# Patient Record
Sex: FEMALE | Race: WHITE | NOT HISPANIC OR LATINO | Employment: FULL TIME | URBAN - NONMETROPOLITAN AREA
[De-identification: names, ages, dates, MRNs, and addresses within clinical notes are randomized per-mention and may not be internally consistent; named-entity substitution may affect disease eponyms.]

---

## 2018-03-21 ENCOUNTER — HOSPITAL ENCOUNTER (OUTPATIENT)
Facility: HOSPITAL | Age: 58
Setting detail: OBSERVATION
Discharge: HOME | End: 2018-03-22
Attending: FAMILY MEDICINE | Admitting: FAMILY MEDICINE

## 2018-03-21 ENCOUNTER — APPOINTMENT (OUTPATIENT)
Dept: GENERAL RADIOLOGY | Facility: HOSPITAL | Age: 58
End: 2018-03-21

## 2018-03-21 ENCOUNTER — APPOINTMENT (OUTPATIENT)
Dept: CARDIOLOGY | Facility: HOSPITAL | Age: 58
End: 2018-03-21

## 2018-03-21 PROBLEM — I47.1 SVT (SUPRAVENTRICULAR TACHYCARDIA) (HCC): Status: ACTIVE | Noted: 2018-03-21

## 2018-03-21 LAB
ALBUMIN SERPL-MCNC: 4.1 G/DL (ref 3.5–5)
ALBUMIN/GLOB SERPL: 1.4 G/DL (ref 1.1–2.5)
ALP SERPL-CCNC: 62 U/L (ref 24–120)
ALT SERPL W P-5'-P-CCNC: 51 U/L (ref 0–54)
ANION GAP SERPL CALCULATED.3IONS-SCNC: 11 MMOL/L (ref 4–13)
AST SERPL-CCNC: 51 U/L (ref 7–45)
BASOPHILS # BLD AUTO: 0.02 10*3/MM3 (ref 0–0.2)
BASOPHILS NFR BLD AUTO: 0.3 % (ref 0–2)
BH CV ECHO MEAS - AO MAX PG (FULL): 4.5 MMHG
BH CV ECHO MEAS - AO MAX PG: 8.1 MMHG
BH CV ECHO MEAS - AO MEAN PG (FULL): 2 MMHG
BH CV ECHO MEAS - AO MEAN PG: 4 MMHG
BH CV ECHO MEAS - AO ROOT AREA (BSA CORRECTED): 1.5
BH CV ECHO MEAS - AO ROOT AREA: 5.7 CM^2
BH CV ECHO MEAS - AO ROOT DIAM: 2.7 CM
BH CV ECHO MEAS - AO V2 MAX: 142 CM/SEC
BH CV ECHO MEAS - AO V2 MEAN: 93.6 CM/SEC
BH CV ECHO MEAS - AO V2 VTI: 26.9 CM
BH CV ECHO MEAS - AVA(I,A): 2 CM^2
BH CV ECHO MEAS - AVA(I,D): 2 CM^2
BH CV ECHO MEAS - AVA(V,A): 2.1 CM^2
BH CV ECHO MEAS - AVA(V,D): 2.1 CM^2
BH CV ECHO MEAS - BSA(HAYCOCK): 1.9 M^2
BH CV ECHO MEAS - BSA: 1.8 M^2
BH CV ECHO MEAS - BZI_BMI: 30.1 KILOGRAMS/M^2
BH CV ECHO MEAS - BZI_METRIC_HEIGHT: 160 CM
BH CV ECHO MEAS - BZI_METRIC_WEIGHT: 77.1 KG
BH CV ECHO MEAS - EDV(CUBED): 117.6 ML
BH CV ECHO MEAS - EDV(TEICH): 112.8 ML
BH CV ECHO MEAS - EF(CUBED): 81.3 %
BH CV ECHO MEAS - EF(TEICH): 73.8 %
BH CV ECHO MEAS - ESV(CUBED): 22 ML
BH CV ECHO MEAS - ESV(TEICH): 29.6 ML
BH CV ECHO MEAS - FS: 42.9 %
BH CV ECHO MEAS - IVS/LVPW: 1.1
BH CV ECHO MEAS - IVSD: 0.9 CM
BH CV ECHO MEAS - LA DIMENSION: 3.1 CM
BH CV ECHO MEAS - LA/AO: 1.1
BH CV ECHO MEAS - LAT PEAK E' VEL: 7.9 CM/SEC
BH CV ECHO MEAS - LV MASS(C)D: 141.9 GRAMS
BH CV ECHO MEAS - LV MASS(C)DI: 78.6 GRAMS/M^2
BH CV ECHO MEAS - LV MAX PG: 3.6 MMHG
BH CV ECHO MEAS - LV MEAN PG: 2 MMHG
BH CV ECHO MEAS - LV V1 MAX: 94.6 CM/SEC
BH CV ECHO MEAS - LV V1 MEAN: 61.4 CM/SEC
BH CV ECHO MEAS - LV V1 VTI: 17 CM
BH CV ECHO MEAS - LVIDD: 4.9 CM
BH CV ECHO MEAS - LVIDS: 2.8 CM
BH CV ECHO MEAS - LVOT AREA (M): 3.1 CM^2
BH CV ECHO MEAS - LVOT AREA: 3.1 CM^2
BH CV ECHO MEAS - LVOT DIAM: 2 CM
BH CV ECHO MEAS - LVPWD: 0.8 CM
BH CV ECHO MEAS - MED PEAK E' VEL: 7.18 CM/SEC
BH CV ECHO MEAS - MV A MAX VEL: 83.9 CM/SEC
BH CV ECHO MEAS - MV DEC TIME: 0.17 SEC
BH CV ECHO MEAS - MV E MAX VEL: 87.3 CM/SEC
BH CV ECHO MEAS - MV E/A: 1
BH CV ECHO MEAS - RAP SYSTOLE: 5 MMHG
BH CV ECHO MEAS - RVSP: 36.6 MMHG
BH CV ECHO MEAS - SI(AO): 85.3 ML/M^2
BH CV ECHO MEAS - SI(CUBED): 53 ML/M^2
BH CV ECHO MEAS - SI(LVOT): 29.6 ML/M^2
BH CV ECHO MEAS - SI(TEICH): 46.1 ML/M^2
BH CV ECHO MEAS - SV(AO): 154 ML
BH CV ECHO MEAS - SV(CUBED): 95.7 ML
BH CV ECHO MEAS - SV(LVOT): 53.4 ML
BH CV ECHO MEAS - SV(TEICH): 83.3 ML
BH CV ECHO MEAS - TR MAX VEL: 281 CM/SEC
BILIRUB SERPL-MCNC: 0.2 MG/DL (ref 0.1–1)
BUN BLD-MCNC: 10 MG/DL (ref 5–21)
BUN/CREAT SERPL: 21.7 (ref 7–25)
CALCIUM SPEC-SCNC: 9.6 MG/DL (ref 8.4–10.4)
CHLORIDE SERPL-SCNC: 104 MMOL/L (ref 98–110)
CK SERPL-CCNC: 92 U/L (ref 0–203)
CO2 SERPL-SCNC: 26 MMOL/L (ref 24–31)
CREAT BLD-MCNC: 0.46 MG/DL (ref 0.5–1.4)
DEPRECATED RDW RBC AUTO: 39.9 FL (ref 40–54)
E/E' RATIO: 12.2
EOSINOPHIL # BLD AUTO: 0.09 10*3/MM3 (ref 0–0.7)
EOSINOPHIL NFR BLD AUTO: 1.2 % (ref 0–4)
ERYTHROCYTE [DISTWIDTH] IN BLOOD BY AUTOMATED COUNT: 12.9 % (ref 12–15)
GFR SERPL CREATININE-BSD FRML MDRD: 139 ML/MIN/1.73
GLOBULIN UR ELPH-MCNC: 2.9 GM/DL
GLUCOSE BLD-MCNC: 162 MG/DL (ref 70–100)
GLUCOSE BLDC GLUCOMTR-MCNC: 159 MG/DL (ref 70–130)
HBA1C MFR BLD: 7.9 %
HCT VFR BLD AUTO: 38.3 % (ref 37–47)
HGB BLD-MCNC: 12.8 G/DL (ref 12–16)
IMM GRANULOCYTES # BLD: 0.04 10*3/MM3 (ref 0–0.03)
IMM GRANULOCYTES NFR BLD: 0.5 % (ref 0–5)
LEFT ATRIUM VOLUME INDEX: 26.9 ML/M2
LEFT ATRIUM VOLUME: 48.5 CM3
LV EF 2D ECHO EST: 65 %
LYMPHOCYTES # BLD AUTO: 1.52 10*3/MM3 (ref 0.72–4.86)
LYMPHOCYTES NFR BLD AUTO: 20.2 % (ref 15–45)
MAGNESIUM SERPL-MCNC: 1.7 MG/DL (ref 1.4–2.2)
MAXIMAL PREDICTED HEART RATE: 161 BPM
MCH RBC QN AUTO: 28.4 PG (ref 28–32)
MCHC RBC AUTO-ENTMCNC: 33.4 G/DL (ref 33–36)
MCV RBC AUTO: 85.1 FL (ref 82–98)
MONOCYTES # BLD AUTO: 0.45 10*3/MM3 (ref 0.19–1.3)
MONOCYTES NFR BLD AUTO: 6 % (ref 4–12)
NEUTROPHILS # BLD AUTO: 5.41 10*3/MM3 (ref 1.87–8.4)
NEUTROPHILS NFR BLD AUTO: 71.8 % (ref 39–78)
NRBC BLD MANUAL-RTO: 0 /100 WBC (ref 0–0)
NT-PROBNP SERPL-MCNC: 586 PG/ML (ref 0–900)
PLATELET # BLD AUTO: 289 10*3/MM3 (ref 130–400)
PMV BLD AUTO: 8.5 FL (ref 6–12)
POTASSIUM BLD-SCNC: 4.2 MMOL/L (ref 3.5–5.3)
PROT SERPL-MCNC: 7 G/DL (ref 6.3–8.7)
RBC # BLD AUTO: 4.5 10*6/MM3 (ref 4.2–5.4)
SODIUM BLD-SCNC: 141 MMOL/L (ref 135–145)
STRESS TARGET HR: 137 BPM
T4 FREE SERPL-MCNC: 1.21 NG/DL (ref 0.78–2.19)
TROPONIN I SERPL-MCNC: 0.06 NG/ML (ref 0–0.03)
TSH SERPL DL<=0.05 MIU/L-ACNC: 0.75 MIU/ML (ref 0.47–4.68)
WBC NRBC COR # BLD: 7.53 10*3/MM3 (ref 4.8–10.8)

## 2018-03-21 PROCEDURE — 71046 X-RAY EXAM CHEST 2 VIEWS: CPT

## 2018-03-21 PROCEDURE — 82550 ASSAY OF CK (CPK): CPT | Performed by: PHYSICIAN ASSISTANT

## 2018-03-21 PROCEDURE — 25010000002 ENOXAPARIN PER 10 MG: Performed by: PHYSICIAN ASSISTANT

## 2018-03-21 PROCEDURE — 84484 ASSAY OF TROPONIN QUANT: CPT | Performed by: PHYSICIAN ASSISTANT

## 2018-03-21 PROCEDURE — 85025 COMPLETE CBC W/AUTO DIFF WBC: CPT | Performed by: PHYSICIAN ASSISTANT

## 2018-03-21 PROCEDURE — 93010 ELECTROCARDIOGRAM REPORT: CPT | Performed by: INTERNAL MEDICINE

## 2018-03-21 PROCEDURE — 96372 THER/PROPH/DIAG INJ SC/IM: CPT

## 2018-03-21 PROCEDURE — 84443 ASSAY THYROID STIM HORMONE: CPT | Performed by: PHYSICIAN ASSISTANT

## 2018-03-21 PROCEDURE — 93306 TTE W/DOPPLER COMPLETE: CPT | Performed by: INTERNAL MEDICINE

## 2018-03-21 PROCEDURE — 83735 ASSAY OF MAGNESIUM: CPT | Performed by: PHYSICIAN ASSISTANT

## 2018-03-21 PROCEDURE — 83036 HEMOGLOBIN GLYCOSYLATED A1C: CPT | Performed by: PHYSICIAN ASSISTANT

## 2018-03-21 PROCEDURE — 82962 GLUCOSE BLOOD TEST: CPT

## 2018-03-21 PROCEDURE — G0378 HOSPITAL OBSERVATION PER HR: HCPCS

## 2018-03-21 PROCEDURE — 83880 ASSAY OF NATRIURETIC PEPTIDE: CPT | Performed by: PHYSICIAN ASSISTANT

## 2018-03-21 PROCEDURE — 93005 ELECTROCARDIOGRAM TRACING: CPT | Performed by: PHYSICIAN ASSISTANT

## 2018-03-21 PROCEDURE — 63710000001 INSULIN LISPRO (HUMAN) PER 5 UNITS: Performed by: FAMILY MEDICINE

## 2018-03-21 PROCEDURE — 84439 ASSAY OF FREE THYROXINE: CPT | Performed by: PHYSICIAN ASSISTANT

## 2018-03-21 PROCEDURE — 93306 TTE W/DOPPLER COMPLETE: CPT

## 2018-03-21 PROCEDURE — 80053 COMPREHEN METABOLIC PANEL: CPT | Performed by: PHYSICIAN ASSISTANT

## 2018-03-21 PROCEDURE — 99203 OFFICE O/P NEW LOW 30 MIN: CPT | Performed by: INTERNAL MEDICINE

## 2018-03-21 RX ORDER — SODIUM CHLORIDE 9 MG/ML
100 INJECTION, SOLUTION INTRAVENOUS CONTINUOUS
Status: DISCONTINUED | OUTPATIENT
Start: 2018-03-21 | End: 2018-03-22 | Stop reason: HOSPADM

## 2018-03-21 RX ORDER — DEXTROSE MONOHYDRATE 25 G/50ML
25 INJECTION, SOLUTION INTRAVENOUS
Status: DISCONTINUED | OUTPATIENT
Start: 2018-03-21 | End: 2018-03-22 | Stop reason: HOSPADM

## 2018-03-21 RX ORDER — SODIUM CHLORIDE 0.9 % (FLUSH) 0.9 %
1-10 SYRINGE (ML) INJECTION AS NEEDED
Status: DISCONTINUED | OUTPATIENT
Start: 2018-03-21 | End: 2018-03-22 | Stop reason: HOSPADM

## 2018-03-21 RX ORDER — METOPROLOL SUCCINATE 100 MG/1
100 TABLET, EXTENDED RELEASE ORAL DAILY
COMMUNITY

## 2018-03-21 RX ORDER — NICOTINE POLACRILEX 4 MG
15 LOZENGE BUCCAL
Status: DISCONTINUED | OUTPATIENT
Start: 2018-03-21 | End: 2018-03-22 | Stop reason: HOSPADM

## 2018-03-21 RX ORDER — GLIMEPIRIDE 4 MG/1
4 TABLET ORAL NIGHTLY
COMMUNITY

## 2018-03-21 RX ADMIN — ENOXAPARIN SODIUM 40 MG: 40 INJECTION SUBCUTANEOUS at 14:01

## 2018-03-21 RX ADMIN — SODIUM CHLORIDE 100 ML/HR: 9 INJECTION, SOLUTION INTRAVENOUS at 14:01

## 2018-03-21 RX ADMIN — SODIUM CHLORIDE 100 ML/HR: 9 INJECTION, SOLUTION INTRAVENOUS at 23:25

## 2018-03-21 RX ADMIN — INSULIN LISPRO 2 UNITS: 100 INJECTION, SOLUTION INTRAVENOUS; SUBCUTANEOUS at 21:04

## 2018-03-21 NOTE — CONSULTS
[facility] HEART GROUP CONSULT NOTE    Referring Provider: Juvenal Mane MD    Reason for Consultation: SVT    No chief complaint on file.      Subjective .     History of present illness:  Jewell Mensah is a 57 y.o. female with history of diabetes mellitus and hyperlipidemia who presents to [facility] as a direct admit from Dr. Mane's office. The patient notes beginning around 7 this morning she noted very fast heart rate with associated dizziness and leg/arm weakness or heaviness. She states that this continued. She tried to work, but because this continued presented to Dr. Mane's. She tells me that she had an episode of exertional dyspnea the other day while walking up the stairs. She denies any associated chest pain. She tells me she has had dyspnea on other occurrences in which her heart was racing. She notes at least 6 episodes of this in the last 2-3 years. Currently, she is resting comfortably without complaint. She denies any cardiac history, tobacco abuse, hypertension.     History  Past Medical History:   Diagnosis Date   • A-fib    • Diabetes mellitus    ,   Past Surgical History:   Procedure Laterality Date   • APPENDECTOMY     • CARPAL TUNNEL RELEASE     • CHOLECYSTECTOMY     • HYSTERECTOMY     • KNEE SURGERY     • TONSILLECTOMY     • TRIGGER FINGER RELEASE     ,   Family History   Problem Relation Age of Onset   • Heart disease Mother    • Diabetes Mother    • Hypertension Mother    ,   Social History   Substance Use Topics   • Smoking status: Never Smoker   • Smokeless tobacco: Never Used   • Alcohol use No   ,     Medications  Current Facility-Administered Medications   Medication Dose Route Frequency Provider Last Rate Last Dose   • enoxaparin (LOVENOX) syringe 40 mg  40 mg Subcutaneous Q24H AGA Dean   40 mg at 03/21/18 1401   • sodium chloride 0.9 % flush 1-10 mL  1-10 mL Intravenous PRN AGA Dean       • sodium chloride 0.9 % infusion  100 mL/hr  "Intravenous Continuous AGA Dean 100 mL/hr at 03/21/18 1401 100 mL/hr at 03/21/18 1401       Allergies:  Codeine and Phenergan [promethazine hcl]    Review of Systems  Review of Systems   Constitution: Negative for malaise/fatigue and weight gain.   Cardiovascular: Positive for dyspnea on exertion and palpitations. Negative for chest pain, claudication, irregular heartbeat, leg swelling, near-syncope, orthopnea, paroxysmal nocturnal dyspnea and syncope.   Respiratory: Positive for shortness of breath. Negative for hemoptysis.    Hematologic/Lymphatic: Negative for bleeding problem.   Skin: Negative for poor wound healing.   Musculoskeletal: Negative for myalgias.   Gastrointestinal: Negative for melena, nausea and vomiting.   Genitourinary: Negative for hematuria.   Neurological: Positive for dizziness and light-headedness. Negative for focal weakness.   Psychiatric/Behavioral: Negative for memory loss.   All other systems reviewed and are negative.      Objective     Physical Exam:  Patient Vitals for the past 24 hrs:   BP Temp Temp src Pulse Resp SpO2 Height Weight   03/21/18 1315 135/66 96.8 °F (36 °C) Temporal Art 89 18 97 % 160 cm (63\") 77.2 kg (170 lb 3.2 oz)     Physical Exam   Constitutional: She is oriented to person, place, and time. She appears well-developed and well-nourished.   HENT:   Head: Normocephalic and atraumatic.   Eyes: Conjunctivae and EOM are normal. Pupils are equal, round, and reactive to light.   Neck: Normal range of motion. Neck supple. No JVD present.   Cardiovascular: Normal rate, regular rhythm, S1 normal, S2 normal, normal heart sounds, intact distal pulses and normal pulses.    No murmur heard.  Pulmonary/Chest: Effort normal and breath sounds normal. No respiratory distress.   Abdominal: Soft. Bowel sounds are normal. She exhibits no distension.   Musculoskeletal: She exhibits no edema.   Neurological: She is alert and oriented to person, place, and time.   Skin: " Skin is warm and dry.   Psychiatric: She has a normal mood and affect. Judgment normal.   Vitals reviewed.      Results Review:   I reviewed the patient's new clinical results.    Lab Results (last 72 hours)     Procedure Component Value Units Date/Time    CBC Auto Differential [[id number]]  (Abnormal) Collected:  03/21/18 1351    Specimen:  Blood Updated:  03/21/18 1410     WBC 7.53 10*3/mm3      RBC 4.50 10*6/mm3      Hemoglobin 12.8 g/dL      Hematocrit 38.3 %      MCV 85.1 fL      MCH 28.4 pg      MCHC 33.4 g/dL      RDW 12.9 %      RDW-SD 39.9 (L) fl      MPV 8.5 fL      Platelets 289 10*3/mm3      Neutrophil % 71.8 %      Lymphocyte % 20.2 %      Monocyte % 6.0 %      Eosinophil % 1.2 %      Basophil % 0.3 %      Immature Grans % 0.5 %      Neutrophils, Absolute 5.41 10*3/mm3      Lymphocytes, Absolute 1.52 10*3/mm3      Monocytes, Absolute 0.45 10*3/mm3      Eosinophils, Absolute 0.09 10*3/mm3      Basophils, Absolute 0.02 10*3/mm3      Immature Grans, Absolute 0.04 (H) 10*3/mm3      nRBC 0.0 /100 WBC     Hemoglobin A1c [[id number]] Collected:  03/21/18 1351    Specimen:  Blood Updated:  03/21/18 1404    T4, Free [[id number]] Collected:  03/21/18 1351    Specimen:  Blood Updated:  03/21/18 1404    TSH [[id number]] Collected:  03/21/18 1351    Specimen:  Blood Updated:  03/21/18 1404    BNP [[id number]] Collected:  03/21/18 1351    Specimen:  Blood Updated:  03/21/18 1404    CK [[id number]] Collected:  03/21/18 1351    Specimen:  Blood Updated:  03/21/18 1404    Comprehensive Metabolic Panel [[id number]] Collected:  03/21/18 1351    Specimen:  Blood Updated:  03/21/18 1404    Troponin [[id number]] Collected:  03/21/18 1351    Specimen:  Blood Updated:  03/21/18 1404          No results found for: ECHOEFEST    Imaging Results (last 72 hours)     ** No results found for the last 72 hours. **        Assessment   1. Supraventricular tachcyardia vs. Atrial flutter: difficult to tell based on 1 EKG with tachycardia  2.  Diabetes mellitus type 2  3. Hyperlipidemia    Plan   1. Patient has been in normal sinus rhythm since admission. Will obtain Mg in addition to other labs. No lyte abnormality or similar thus far. Difficult to determine if patient's EKG shows atrial flutter vs. Supraventricular tachycardia. This will have to be further evaluated by telemetry prior to further medical decision making. If this does not recur, I would suggest 2 week rhythm monitoring as an outpatient.   2. Echo, chest x-ray pending  3. Patient describes some dyspnea on exertion. Could certainly be related to arrhythmia. Again, will have to further evaluate. If arrhythmia is resolved long term and continues with this, would consider ischemic workup at that time. She does have a mnimal troponin elevation initially. Trend accordingly.     Further orders per Dr. Johansen upon his evaluation of the patient.     Thank you for the consultation, cardiology will gladly continue to follow.     Piedad Kaplan PA-C        Please note this cardiology consultation note is the result of a face to face consultation with the patient, in addition to reviewing medical records at length by myself, Piedad Kaplan PA-C.    Time: appx 35 minutes

## 2018-03-21 NOTE — PLAN OF CARE
Problem: Patient Care Overview  Goal: Plan of Care Review  Outcome: Ongoing (interventions implemented as appropriate)   03/21/18 8710   Coping/Psychosocial   Plan of Care Reviewed With patient   Plan of Care Review   Progress no change   OTHER   Outcome Summary patient admitted from dr lange office. no complaints of pain. no further runs of svt since admit. NSR on tele.        Problem: Arrhythmia/Dysrhythmia (Symptomatic) (Adult)  Goal: Signs and Symptoms of Listed Potential Problems Will be Absent, Minimized or Managed (Arrhythmia/Dysrhythmia)  Outcome: Ongoing (interventions implemented as appropriate)

## 2018-03-22 VITALS
DIASTOLIC BLOOD PRESSURE: 57 MMHG | HEART RATE: 79 BPM | SYSTOLIC BLOOD PRESSURE: 116 MMHG | RESPIRATION RATE: 18 BRPM | BODY MASS INDEX: 30.16 KG/M2 | HEIGHT: 63 IN | TEMPERATURE: 98.2 F | OXYGEN SATURATION: 94 % | WEIGHT: 170.2 LBS

## 2018-03-22 LAB
GLUCOSE BLDC GLUCOMTR-MCNC: 180 MG/DL (ref 70–130)
GLUCOSE BLDC GLUCOMTR-MCNC: 193 MG/DL (ref 70–130)
TROPONIN I SERPL-MCNC: 0.11 NG/ML (ref 0–0.03)

## 2018-03-22 PROCEDURE — 63710000001 INSULIN LISPRO (HUMAN) PER 5 UNITS: Performed by: FAMILY MEDICINE

## 2018-03-22 PROCEDURE — G0378 HOSPITAL OBSERVATION PER HR: HCPCS

## 2018-03-22 PROCEDURE — 82962 GLUCOSE BLOOD TEST: CPT

## 2018-03-22 RX ADMIN — INSULIN LISPRO 2 UNITS: 100 INJECTION, SOLUTION INTRAVENOUS; SUBCUTANEOUS at 12:41

## 2018-03-22 RX ADMIN — INSULIN LISPRO 2 UNITS: 100 INJECTION, SOLUTION INTRAVENOUS; SUBCUTANEOUS at 09:42

## 2018-03-22 RX ADMIN — SODIUM CHLORIDE 100 ML/HR: 9 INJECTION, SOLUTION INTRAVENOUS at 09:44

## 2018-03-22 NOTE — DISCHARGE SUMMARY
Hospital Discharge Summary    Jewell Mensah  :  1960  MRN:  [id number]    Admit date:  3/21/2018  Discharge date:   3/22/2018      Admitting Physician:    Juvenal Mane MD    Discharge Diagnoses:    Active Problems:    SVT (supraventricular tachycardia)      Hospital Course:   Jewell Mensah is a 57 y.o. female with history of diabetes mellitus and hyperlipidemia who presents to [facility] as a direct admit from Dr. Mane's office. The patient notes beginning around 7 this morning she noted very fast heart rate with associated dizziness and leg/arm weakness or heaviness. She states that this continued. She tried to work, but because this continued presented to Dr. Mane's. She tells me that she had an episode of exertional dyspnea the other day while walking up the stairs. She denies any associated chest pain. She tells me she has had dyspnea on other occurrences in which her heart was racing. She notes at least 6 episodes of this in the last 2-3 years. Currently, she is resting comfortably without complaint. She denies any cardiac history, tobacco abuse, hypertension. She was evaluated by Dr Butler of  and arrangements made for Ablation procedure on 3/29.  Last dose of metoprolol to be 3/27.  Please see daily progress note for further details concerning their stay. The patient improved throughout their stay and reached maximum medical improvement on the day of discharge. The patient/family agree with the treatment plan as outlined above. All questions concerning their stay were answered prior to discharge. They understand the importance of follow up concerning any abnormal test results.       Discharge Medications:       Jewell Mensah   Home Medication Instructions ALEXANDR:[id number]    Printed on:18 1312   Medication Information                      glimepiride (AMARYL) 4 MG tablet  Take 4 mg by mouth Every Night.             metoprolol succinate XL (TOPROL-XL) 100 MG 24 hr tablet  Take  100 mg by mouth Daily.             sitaGLIPtin-metFORMIN (JANUMET)  MG per tablet  Take 1 tablet by mouth 2 (Two) Times a Day With Meals.                 Consults:     Significant Diagnostic Studies:      EKG: unchanged from previous tracings.      Treatments:   As per hpi    Disposition:   home  Follow up with Juvenal Mane MD in 2 weeks.    Signed:  Juvenal Mane MD MPH  3/22/2018, 1:19 PM

## 2018-03-22 NOTE — PLAN OF CARE
Problem: Patient Care Overview  Goal: Plan of Care Review  Outcome: Ongoing (interventions implemented as appropriate)   03/22/18 2821   Coping/Psychosocial   Plan of Care Reviewed With patient   Plan of Care Review   Progress improving   OTHER   Outcome Summary VSS. Denies pain this shift. NSR 74-86 on tele. NPO for EP consult with Dr. Butler this AM. Will cont to monitor.       Problem: Arrhythmia/Dysrhythmia (Symptomatic) (Adult)  Goal: Signs and Symptoms of Listed Potential Problems Will be Absent, Minimized or Managed (Arrhythmia/Dysrhythmia)  Outcome: Ongoing (interventions implemented as appropriate)

## 2018-03-22 NOTE — H&P
[facility]  History and Physical    Patient:  Jewell Mensah  MRN: [id number]    CHIEF COMPLAINT:  Heart racing and syncope  History Obtained From: the patient   PCP: Juvenal Mane MD    HISTORY OF PRESENT ILLNESS:   Jewell Mensah is a 57 y.o. female with history of diabetes mellitus and hyperlipidemia who presents to [facility] as a direct admit from our office. The patient notes beginning around 7 this morning she noted very fast heart rate with associated dizziness and leg/arm weakness or heaviness. She states that this continued. She tried to work, but because this continued presented to our office. She tells me that she had an episode of exertional dyspnea the other day while walking up the stairs. She denies any associated chest pain. She tells me she has had dyspnea on other occurrences in which her heart was racing. She notes at least 6 episodes of this in the last 2-3 years. Currently, she is resting comfortably without complaint. She denies any cardiac history, tobacco abuse, hypertension.     REVIEW OF SYSTEMS:    Constitutional: Negative for activity change, appetite change, chills, fatigue and fever.   HENT: Negative.  Negative for congestion, sinus pressure and sore throat.    Eyes: Negative for pain and discharge.   Respiratory: Negative.  Negative for cough, chest tightness, shortness of breath and wheezing.    Cardiovascular: Negative for chest pain and leg swelling.   Gastrointestinal: Negative for abdominal distention, abdominal pain, diarrhea, nausea and vomiting.   Genitourinary: Negative for difficulty urinating, flank pain, hematuria and urgency.   Musculoskeletal: Negative for arthralgias and back pain.   Skin: Negative for color change, pallor and rash.   Neurological: Negative for dizziness, syncope, numbness and headaches.   Psychiatric/Behavioral: Negative for agitation, behavioral problems and confusion.       Past Medical History:  Past Medical History:   Diagnosis  "Date   • A-fib    • Diabetes mellitus        Past Surgical History:  Past Surgical History:   Procedure Laterality Date   • APPENDECTOMY     • CARPAL TUNNEL RELEASE     • CHOLECYSTECTOMY     • HYSTERECTOMY     • KNEE SURGERY     • TONSILLECTOMY     • TRIGGER FINGER RELEASE         Medications Prior to Admission:    Prescriptions Prior to Admission   Medication Sig Dispense Refill Last Dose   • glimepiride (AMARYL) 4 MG tablet Take 4 mg by mouth Every Night.   3/20/2018 at 2000   • metoprolol succinate XL (TOPROL-XL) 100 MG 24 hr tablet Take 100 mg by mouth Daily.   3/21/2018 at 0800   • sitaGLIPtin-metFORMIN (JANUMET)  MG per tablet Take 1 tablet by mouth 2 (Two) Times a Day With Meals.   3/20/2018 at 2000       Allergies:  Codeine and Phenergan [promethazine hcl]    Social History:   Social History     Social History   • Marital status:      Spouse name: N/A   • Number of children: N/A   • Years of education: N/A     Occupational History   • Not on file.     Social History Main Topics   • Smoking status: Never Smoker   • Smokeless tobacco: Never Used   • Alcohol use No   • Drug use: Unknown   • Sexual activity: Not on file     Other Topics Concern   • Not on file     Social History Narrative   • No narrative on file       Family History:   Family History   Problem Relation Age of Onset   • Heart disease Mother    • Diabetes Mother    • Hypertension Mother            Physical Exam:    Vitals: /57 (BP Location: Left arm, Patient Position: Lying)   Pulse 79   Temp 98.2 °F (36.8 °C) (Temporal Artery )   Resp 18   Ht 160 cm (63\")   Wt 77.2 kg (170 lb 3.2 oz)   SpO2 94%   BMI 30.15 kg/m²        General Appearance:    Alert, cooperative, in no acute distress   Head:    Normocephalic, without obvious abnormality, atraumatic   Eyes:            Lids and lashes normal, conjunctivae and sclerae normal, no   icterus, no pallor, corneas clear, PERRLA   Ears:    Ears appear intact with no abnormalities " noted   Throat:   No oral lesions, no thrush, oral mucosa moist   Neck:   No adenopathy, supple, trachea midline, no thyromegaly, no   carotid bruit, no JVD   Back:     No kyphosis present, no scoliosis present, no skin lesions,      erythema or scars, no tenderness to percussion or                   palpation,   range of motion normal   Lungs:     Clear to auscultation,respirations regular, even and                  unlabored    Heart:    Regular rhythm and normal rate, normal S1 and S2, no            murmur, no gallop, no rub, no click   Chest Wall:    No abnormalities observed   Abdomen:     Normal bowel sounds, no masses, no organomegaly, soft        non-tender, non-distended, no guarding, no rebound                tenderness   Rectal:     Deferred   Extremities:   Moves all extremities well, no edema, no cyanosis, no             redness   Pulses:   Pulses palpable and equal bilaterally   Skin:   No bleeding, bruising or rash   Lymph nodes:   No palpable adenopathy   Neurologic:   Cranial nerves 2 - 12 grossly intact, sensation intact, DTR       present and equal bilaterally       Lab Results (last 24 hours)     Procedure Component Value Units Date/Time    POC Glucose Once [[id number]]  (Abnormal) Collected:  03/22/18 1129    Specimen:  Blood Updated:  03/22/18 1148     Glucose 193 (H) mg/dL      Comment: : [id number] Theo EuraisaMeter ID: [id number]       POC Glucose Once [[id number]]  (Abnormal) Collected:  03/22/18 0822    Specimen:  Blood Updated:  03/22/18 0902     Glucose 180 (H) mg/dL      Comment: : [id number] Theo EuraisaMeter ID: [id number]       Troponin [[id number]]  (Abnormal) Collected:  03/21/18 2332    Specimen:  Blood Updated:  03/22/18 0017     Troponin I 0.111 (H) ng/mL     POC Glucose Once [[id number]]  (Abnormal) Collected:  03/21/18 2100    Specimen:  Blood Updated:  03/21/18 2121     Glucose 159 (H) mg/dL      Comment: : [id number] Solomon Strickland ID: [id number]        Hemoglobin A1c [[id number]] Collected:  03/21/18 1351    Specimen:  Blood Updated:  03/21/18 1556     Hemoglobin A1C 7.9 %     Narrative:       Less than 6.0           Non-Diabetic Range  6.0-7.0                 ADA Therapeutic Target  Greater than 7.0        Action Suggested    Magnesium [[id number]]  (Normal) Collected:  03/21/18 1351    Specimen:  Blood Updated:  03/21/18 1547     Magnesium 1.7 mg/dL     TSH [[id number]]  (Normal) Collected:  03/21/18 1351    Specimen:  Blood Updated:  03/21/18 1457     TSH 0.751 mIU/mL     T4, Free [[id number]]  (Normal) Collected:  03/21/18 1351    Specimen:  Blood Updated:  03/21/18 1443     Free T4 1.21 ng/dL     BNP [[id number]]  (Normal) Collected:  03/21/18 1351    Specimen:  Blood Updated:  03/21/18 1437     proBNP 586.0 pg/mL     Troponin [[id number]]  (Abnormal) Collected:  03/21/18 1351    Specimen:  Blood Updated:  03/21/18 1437     Troponin I 0.060 (H) ng/mL     CK [[id number]]  (Normal) Collected:  03/21/18 1351    Specimen:  Blood Updated:  03/21/18 1428     Creatine Kinase 92 U/L     Comprehensive Metabolic Panel [[id number]]  (Abnormal) Collected:  03/21/18 1351    Specimen:  Blood Updated:  03/21/18 1428     Glucose 162 (H) mg/dL      BUN 10 mg/dL      Creatinine 0.46 (L) mg/dL      Sodium 141 mmol/L      Potassium 4.2 mmol/L      Chloride 104 mmol/L      CO2 26.0 mmol/L      Calcium 9.6 mg/dL      Total Protein 7.0 g/dL      Albumin 4.10 g/dL      ALT (SGPT) 51 U/L      AST (SGOT) 51 (H) U/L      Alkaline Phosphatase 62 U/L      Total Bilirubin 0.2 mg/dL      eGFR Non African Amer 139 mL/min/1.73      Globulin 2.9 gm/dL      A/G Ratio 1.4 g/dL      BUN/Creatinine Ratio 21.7     Anion Gap 11.0 mmol/L     CBC Auto Differential [[id number]]  (Abnormal) Collected:  03/21/18 1351    Specimen:  Blood Updated:  03/21/18 1410     WBC 7.53 10*3/mm3      RBC 4.50 10*6/mm3      Hemoglobin 12.8 g/dL      Hematocrit 38.3 %      MCV 85.1 fL      MCH 28.4 pg      MCHC 33.4 g/dL       RDW 12.9 %      RDW-SD 39.9 (L) fl      MPV 8.5 fL      Platelets 289 10*3/mm3      Neutrophil % 71.8 %      Lymphocyte % 20.2 %      Monocyte % 6.0 %      Eosinophil % 1.2 %      Basophil % 0.3 %      Immature Grans % 0.5 %      Neutrophils, Absolute 5.41 10*3/mm3      Lymphocytes, Absolute 1.52 10*3/mm3      Monocytes, Absolute 0.45 10*3/mm3      Eosinophils, Absolute 0.09 10*3/mm3      Basophils, Absolute 0.02 10*3/mm3      Immature Grans, Absolute 0.04 (H) 10*3/mm3      nRBC 0.0 /100 WBC            -----------------------------------------------------------------    Radiology:     Xr Chest Pa & Lateral    Result Date: 3/21/2018  EXAMINATION: XR CHEST PA AND LATERAL- 3/21/2018 3:54 PM CDT  HISTORY: Chest pain  COMPARISON: 8/31/2015  FINDINGS: Heart appears normal in size. The aorta is tortuous with atherosclerotic calcifications. The lungs appear clear without focal consolidation or effusion. No pneumothorax. The pulmonary vasculature appears normal. Degenerative changes are seen in the spine. No acute bony abnormality is visualized. Surgical clips are seen in the right upper quadrant of the abdomen. A nodule in the right lung base is presumably a nipple shadow.      No evidence of acute cardiopulmonary process. A nodule in the right lung base is presumably a nipple shadow. Consider repeat imaging with nipple markers in place. This report was finalized on 03/21/2018 15:57 by Dr. Doroteo Winkelr MD.      Assessment and Plan   1.     Active Problems:    SVT (supraventricular tachycardia)    PLAN:  Rule out ischemia  Cariology/EP eval.       Juvenal Mane MD

## 2018-03-22 NOTE — CONSULTS
.  Encounter Provider: Juvenal Butler MD  Place of Service: [facility]  Patient Name: Jewell Mensah  :1960  Referral Provider: Juvenal Mane MD    Chief complaint  SVT    History of Present Illness  Pt seen and examined. Rec ords reviewed. She has a h/o recurrent SVT for 4-5 years. Notes a sudden onset of tachycardia associated with weakness. No chest pain. When sustained, develops severe fatigue and has expeirenced near syncope. Presented with SVT in  requiring termination in ER. EKG c/w AVNRT. A stress echo at the time was normal.    Episodes have continued every few months despite metoprolol. No clear precipitating or ameliorating factor.     Developed yesterday  and persisted for several hours. First took normal dose of metoprolol then an extra half. Presented to Dr. Mane's office where SVT documented. Sent to hospital. Terminated on arrival.      Past Medical History:   Diagnosis Date   • A-fib    • Diabetes mellitus        Past Surgical History:   Procedure Laterality Date   • APPENDECTOMY     • CARPAL TUNNEL RELEASE     • CHOLECYSTECTOMY     • HYSTERECTOMY     • KNEE SURGERY     • TONSILLECTOMY     • TRIGGER FINGER RELEASE         Prescriptions Prior to Admission   Medication Sig Dispense Refill Last Dose   • glimepiride (AMARYL) 4 MG tablet Take 4 mg by mouth Every Night.   3/20/2018 at 2000   • metoprolol succinate XL (TOPROL-XL) 100 MG 24 hr tablet Take 100 mg by mouth Daily.   3/21/2018 at 0800   • sitaGLIPtin-metFORMIN (JANUMET)  MG per tablet Take 1 tablet by mouth 2 (Two) Times a Day With Meals.   3/20/2018 at 2000       Current Meds  Scheduled Meds:  enoxaparin 40 mg Subcutaneous Q24H   insulin lispro 2-7 Units Subcutaneous 4x Daily With Meals & Nightly     Continuous Infusions:  sodium chloride 100 mL/hr Last Rate: 100 mL/hr (18 2554)     PRN Meds:.•  dextrose  •  dextrose  •  glucagon (human recombinant)  •  sodium chloride    Allergies  as of 03/21/2018 - Reviewed 03/21/2018   Allergen Reaction Noted   • Codeine GI Intolerance 03/21/2018   • Phenergan [promethazine hcl] Irritability 03/21/2018       Social History     Social History   • Marital status:      Spouse name: N/A   • Number of children: N/A   • Years of education: N/A     Occupational History   • Not on file.     Social History Main Topics   • Smoking status: Never Smoker   • Smokeless tobacco: Never Used   • Alcohol use No   • Drug use: Unknown   • Sexual activity: Not on file     Other Topics Concern   • Not on file     Social History Narrative   • No narrative on file       Family History   Problem Relation Age of Onset   • Heart disease Mother    • Diabetes Mother    • Hypertension Mother        REVIEW OF SYSTEMS:   12 point ROS was performed and is negative except as outlined in HPI     REVIEW OF SYSTEMS:   CONSTITUTIONAL: No weight loss, fever, chills, weakness or fatigue.   HEENT: Eyes: No visual loss, blurred vision, double vision or yellow sclerae. Ears, Nose, Throat: No hearing loss, sneezing, congestion, runny nose or sore throat.   SKIN: No rash or itching.     RESPIRATORY: No shortness of breath, hemoptysis, cough or sputum.   GASTROINTESTINAL: No anorexia, nausea, vomiting or diarrhea. No abdominal pain, bright red blood per rectum or melena.  GENITOURINARY: No burning on urination, hematuria or increased frequency.  NEUROLOGICAL: No headache, dizziness, syncope, paralysis, ataxia, numbness or tingling in the extremities. No change in bowel or bladder control.   MUSCULOSKELETAL: No muscle, back pain, joint pain or stiffness.   HEMATOLOGIC: No anemia, bleeding or bruising.   LYMPHATICS: No enlarged nodes. No history of splenectomy.   PSYCHIATRIC: No history of depression, anxiety, hallucinations.   ENDOCRINOLOGIC: No reports of sweating, cold or heat intolerance. No polyuria or polydipsia.       PE: healthy appearing MA WF in NAD  HEENT- PERRL, OP -,   Neck - supple  without adenopathy or TM  Lungs - clear to A and P; nl resp effort  CV- normal JVP; carotids full without bruit; normal S1S2 no S3S4m  Abd- overnight: BS + soft, NT  Ext - no C/C/E; pulses intact  Neuro: O x 3, a  Skin -warm and dry    Imp:  SVT- recurrent episodes for 4-5 years despite treatment with metoprolol. Some episodes have been associated with near syncope. EKG indicates reentrant SVT, most c/w AVNRT. Excellent candidate for cure with ablation. Discussed procedure and risks in details. She understands and agrees to proceed.  - return 3/29 for ablation  - last dose of metoprolol 3/27.

## 2018-03-23 ENCOUNTER — TRANSCRIBE ORDERS (OUTPATIENT)
Dept: ADMINISTRATIVE | Facility: HOSPITAL | Age: 58
End: 2018-03-23

## 2018-03-29 ENCOUNTER — HOSPITAL ENCOUNTER (OUTPATIENT)
Facility: HOSPITAL | Age: 58
Discharge: HOME | End: 2018-03-29
Attending: INTERNAL MEDICINE | Admitting: INTERNAL MEDICINE

## 2018-03-29 VITALS
BODY MASS INDEX: 27.99 KG/M2 | HEART RATE: 97 BPM | TEMPERATURE: 97.6 F | RESPIRATION RATE: 16 BRPM | HEIGHT: 65 IN | OXYGEN SATURATION: 95 % | WEIGHT: 168 LBS | SYSTOLIC BLOOD PRESSURE: 132 MMHG | DIASTOLIC BLOOD PRESSURE: 75 MMHG

## 2018-03-29 PROCEDURE — C1730 CATH, EP, 19 OR FEW ELECT: HCPCS | Performed by: INTERNAL MEDICINE

## 2018-03-29 PROCEDURE — C1894 INTRO/SHEATH, NON-LASER: HCPCS | Performed by: INTERNAL MEDICINE

## 2018-03-29 PROCEDURE — 25010000002 DOBUTAMINE PER 250 MG: Performed by: INTERNAL MEDICINE

## 2018-03-29 PROCEDURE — 25010000002 MIDAZOLAM PER 1 MG: Performed by: INTERNAL MEDICINE

## 2018-03-29 PROCEDURE — 25010000002 FENTANYL CITRATE (PF) 100 MCG/2ML SOLUTION: Performed by: INTERNAL MEDICINE

## 2018-03-29 PROCEDURE — C1733 CATH, EP, OTHR THAN COOL-TIP: HCPCS | Performed by: INTERNAL MEDICINE

## 2018-03-29 PROCEDURE — 93613 INTRACARDIAC EPHYS 3D MAPG: CPT | Performed by: INTERNAL MEDICINE

## 2018-03-29 PROCEDURE — 93653 COMPRE EP EVAL TX SVT: CPT | Performed by: INTERNAL MEDICINE

## 2018-03-29 RX ORDER — MIDAZOLAM HYDROCHLORIDE 1 MG/ML
INJECTION INTRAMUSCULAR; INTRAVENOUS AS NEEDED
Status: DISCONTINUED | OUTPATIENT
Start: 2018-03-29 | End: 2018-03-29 | Stop reason: HOSPADM

## 2018-03-29 RX ORDER — LIDOCAINE HYDROCHLORIDE 20 MG/ML
INJECTION, SOLUTION INFILTRATION; PERINEURAL AS NEEDED
Status: DISCONTINUED | OUTPATIENT
Start: 2018-03-29 | End: 2018-03-29 | Stop reason: HOSPADM

## 2018-03-29 RX ORDER — DOBUTAMINE HYDROCHLORIDE 100 MG/100ML
INJECTION INTRAVENOUS CONTINUOUS PRN
Status: DISCONTINUED | OUTPATIENT
Start: 2018-03-29 | End: 2018-03-29 | Stop reason: HOSPADM

## 2018-03-29 RX ORDER — KETOROLAC TROMETHAMINE 15 MG/ML
15 INJECTION, SOLUTION INTRAMUSCULAR; INTRAVENOUS EVERY 6 HOURS PRN
Status: DISCONTINUED | OUTPATIENT
Start: 2018-03-29 | End: 2018-03-29 | Stop reason: HOSPADM

## 2018-03-29 RX ORDER — FENTANYL CITRATE 50 UG/ML
INJECTION, SOLUTION INTRAMUSCULAR; INTRAVENOUS AS NEEDED
Status: DISCONTINUED | OUTPATIENT
Start: 2018-03-29 | End: 2018-03-29 | Stop reason: HOSPADM

## 2018-10-05 ENCOUNTER — APPOINTMENT (OUTPATIENT)
Dept: GENERAL RADIOLOGY | Facility: HOSPITAL | Age: 58
End: 2018-10-05

## 2018-10-05 ENCOUNTER — HOSPITAL ENCOUNTER (EMERGENCY)
Facility: HOSPITAL | Age: 58
Discharge: HOME | End: 2018-10-05
Attending: EMERGENCY MEDICINE | Admitting: EMERGENCY MEDICINE

## 2018-10-05 VITALS
OXYGEN SATURATION: 97 % | SYSTOLIC BLOOD PRESSURE: 127 MMHG | BODY MASS INDEX: 28.7 KG/M2 | TEMPERATURE: 97.7 F | HEIGHT: 63 IN | RESPIRATION RATE: 14 BRPM | WEIGHT: 162 LBS | DIASTOLIC BLOOD PRESSURE: 69 MMHG | HEART RATE: 88 BPM

## 2018-10-05 DIAGNOSIS — J06.9 UPPER RESPIRATORY TRACT INFECTION, UNSPECIFIED TYPE: ICD-10-CM

## 2018-10-05 DIAGNOSIS — J02.9 PHARYNGITIS, UNSPECIFIED ETIOLOGY: Primary | ICD-10-CM

## 2018-10-05 LAB — S PYO AG THROAT QL: NEGATIVE

## 2018-10-05 PROCEDURE — 87081 CULTURE SCREEN ONLY: CPT | Performed by: EMERGENCY MEDICINE

## 2018-10-05 PROCEDURE — 99283 EMERGENCY DEPT VISIT LOW MDM: CPT

## 2018-10-05 PROCEDURE — 87880 STREP A ASSAY W/OPTIC: CPT | Performed by: EMERGENCY MEDICINE

## 2018-10-05 PROCEDURE — 71046 X-RAY EXAM CHEST 2 VIEWS: CPT

## 2018-10-05 RX ORDER — FEXOFENADINE HCL 180 MG/1
180 TABLET ORAL DAILY
Qty: 60 TABLET | Refills: 0 | Status: SHIPPED | OUTPATIENT
Start: 2018-10-05

## 2018-10-05 RX ORDER — BENZONATATE 100 MG/1
100 CAPSULE ORAL 3 TIMES DAILY PRN
Qty: 15 CAPSULE | Refills: 0 | Status: SHIPPED | OUTPATIENT
Start: 2018-10-05

## 2018-10-05 NOTE — DISCHARGE INSTRUCTIONS
Pharyngitis  Pharyngitis is redness, pain, and swelling (inflammation) of the throat (pharynx). It is a very common cause of sore throat. Pharyngitis can be caused by a bacteria, but it is usually caused by a virus. Most cases of pharyngitis get better on their own without treatment.  What are the causes?  This condition may be caused by:  · Infection by viruses (viral). Viral pharyngitis spreads from person to person (is contagious) through coughing, sneezing, and sharing of personal items or utensils such as cups, forks, spoons, and toothbrushes.  · Infection by bacteria (bacterial). Bacterial pharyngitis may be spread by touching the nose or face after coming in contact with the bacteria, or through more intimate contact, such as kissing.  · Allergies. Allergies can cause buildup of mucus in the throat (post-nasal drip), leading to inflammation and irritation. Allergies can also cause blocked nasal passages, forcing breathing through the mouth, which dries and irritates the throat.    What increases the risk?  You are more likely to develop this condition if:  · You are 5-24 years old.  · You are exposed to crowded environments such as , school, or dormitory living.  · You live in a cold climate.  · You have a weakened disease-fighting (immune) system.    What are the signs or symptoms?  Symptoms of this condition vary by the cause (viral, bacterial, or allergies) and can include:  · Sore throat.  · Fatigue.  · Low-grade fever.  · Headache.  · Joint pain and muscle aches.  · Skin rashes.  · Swollen glands in the throat (lymph nodes).  · Plaque-like film on the throat or tonsils. This is often a symptom of bacterial pharyngitis.  · Vomiting.  · Stuffy nose (nasal congestion).  · Cough.  · Red, itchy eyes (conjunctivitis).  · Loss of appetite.    How is this diagnosed?  This condition is often diagnosed based on your medical history and a physical exam. Your health care provider will ask you questions  about your illness and your symptoms. A swab of your throat may be done to check for bacteria (rapid strep test). Other lab tests may also be done, depending on the suspected cause, but these are rare.  How is this treated?  This condition usually gets better in 3-4 days without medicine. Bacterial pharyngitis may be treated with antibiotic medicines.  Follow these instructions at home:  · Take over-the-counter and prescription medicines only as told by your health care provider.  ? If you were prescribed an antibiotic medicine, take it as told by your health care provider. Do not stop taking the antibiotic even if you start to feel better.  ? Do not give children aspirin because of the association with Reye syndrome.  · Drink enough water and fluids to keep your urine clear or pale yellow.  · Get a lot of rest.  · Gargle with a salt-water mixture 3-4 times a day or as needed. To make a salt-water mixture, completely dissolve ½-1 tsp of salt in 1 cup of warm water.  · If your health care provider approves, you may use throat lozenges or sprays to soothe your throat.  Contact a health care provider if:  · You have large, tender lumps in your neck.  · You have a rash.  · You cough up green, yellow-brown, or bloody spit.  Get help right away if:  · Your neck becomes stiff.  · You drool or are unable to swallow liquids.  · You cannot drink or take medicines without vomiting.  · You have severe pain that does not go away, even after you take medicine.  · You have trouble breathing, and it is not caused by a stuffy nose.  · You have new pain and swelling in your joints such as the knees, ankles, wrists, or elbows.  Summary  · Pharyngitis is redness, pain, and swelling (inflammation) of the throat (pharynx).  · While pharyngitis can be caused by a bacteria, the most common causes are viral.  · Most cases of pharyngitis get better on their own without treatment.  · Bacterial pharyngitis is treated with antibiotic  medicines.  This information is not intended to replace advice given to you by your health care provider. Make sure you discuss any questions you have with your health care provider.  Document Released: 12/18/2006 Document Revised: 01/23/2018 Document Reviewed: 01/23/2018  [employer] Interactive Patient Education © 2018 [employer]  Cough, Adult  Coughing is a reflex that clears your throat and your airways. Coughing helps to heal and protect your lungs. It is normal to cough occasionally, but a cough that happens with other symptoms or lasts a long time may be a sign of a condition that needs treatment. A cough may last only 2-3 weeks (acute), or it may last longer than 8 weeks (chronic).  What are the causes?  Coughing is commonly caused by:  · Breathing in substances that irritate your lungs.  · A viral or bacterial respiratory infection.  · Allergies.  · Asthma.  · Postnasal drip.  · Smoking.  · Acid backing up from the stomach into the esophagus (gastroesophageal reflux).  · Certain medicines.  · Chronic lung problems, including COPD (or rarely, lung cancer).  · Other medical conditions such as heart failure.    Follow these instructions at home:  Pay attention to any changes in your symptoms. Take these actions to help with your discomfort:  · Take medicines only as told by your health care provider.  ? If you were prescribed an antibiotic medicine, take it as told by your health care provider. Do not stop taking the antibiotic even if you start to feel better.  ? Talk with your health care provider before you take a cough suppressant medicine.  · Drink enough fluid to keep your urine clear or pale yellow.  · If the air is dry, use a cold steam vaporizer or humidifier in your bedroom or your home to help loosen secretions.  · Avoid anything that causes you to cough at work or at home.  · If your cough is worse at night, try sleeping in a semi-upright position.  · Avoid cigarette smoke. If you smoke, quit  smoking. If you need help quitting, ask your health care provider.  · Avoid caffeine.  · Avoid alcohol.  · Rest as needed.    Contact a health care provider if:  · You have new symptoms.  · You cough up pus.  · Your cough does not get better after 2-3 weeks, or your cough gets worse.  · You cannot control your cough with suppressant medicines and you are losing sleep.  · You develop pain that is getting worse or pain that is not controlled with pain medicines.  · You have a fever.  · You have unexplained weight loss.  · You have night sweats.  Get help right away if:  · You cough up blood.  · You have difficulty breathing.  · Your heartbeat is very fast.  This information is not intended to replace advice given to you by your health care provider. Make sure you discuss any questions you have with your health care provider.  Document Released: 06/15/2012 Document Revised: 05/25/2017 Document Reviewed: 02/24/2016  [employer] Interactive Patient Education © 2018 [employer]

## 2018-10-05 NOTE — ED PROVIDER NOTES
Subjective   56 y/o female arrives for evaluation of sore throat, bilateral ear pain, cough that is only when laying flat without associated fevers, chills, falls, trauma, sob, hemoptysis, flank or back pain, dysuria, hematuria, palpations, ill contacts, history of CHF or ACS, headaches, rashes or any other issues. She arrives in [facility], speech is clear, handling her own secretions.         Family, social and past history reviewed as below, prior documentation of H and Ps and other documentation are reviewed:    Past Medical History:  No date: A-fib (CMS/Formerly [facility])  No date: Diabetes mellitus (CMS/[facility]ly [facility])    Past Surgical History:  No date: APPENDECTOMY  3/29/2018: CARDIAC ELECTROPHYSIOLOGY PROCEDURE; N/A      Comment:  Procedure: SVT ABLATION WITH VELOCITY;  Surgeon: Juvenal Bulter MD;  Location: [facility] CATH INVASIVE                LOCATION;  Service: Cardiology  No date: CARPAL TUNNEL RELEASE  No date: CHOLECYSTECTOMY  No date: HYSTERECTOMY  No date: KNEE SURGERY  No date: TONSILLECTOMY  No date: TRIGGER FINGER RELEASE    Social History    Marital status:              Spouse name:                       Years of education:                 Number of children:               Occupational History    None on file    Social History Main Topics    Smoking status: Never Smoker                                                                Smokeless tobacco: Never Used                        Alcohol use: No              Drug use: No              Sexual activity: Not on file          Other Topics            Concern    None on file    Social History Narrative    None on file        Family history: reviewed and noncontributory             Review of Systems   All other systems reviewed and are negative.      Past Medical History:   Diagnosis Date   • A-fib (CMS/Formerly [facility])    • Diabetes mellitus (CMS/[facility]ly [facility])        Allergies   Allergen Reactions   • Codeine GI Intolerance   • Phenergan [Promethazine Hcl] Irritability   •  Tetanus Toxoids GI Intolerance       Past Surgical History:   Procedure Laterality Date   • APPENDECTOMY     • CARDIAC ELECTROPHYSIOLOGY PROCEDURE N/A 3/29/2018    Procedure: SVT ABLATION WITH VELOCITY;  Surgeon: Juvenal Butler MD;  Location:  [facility] CATH INVASIVE LOCATION;  Service: Cardiology   • CARPAL TUNNEL RELEASE     • CHOLECYSTECTOMY     • HYSTERECTOMY     • KNEE SURGERY     • TONSILLECTOMY     • TRIGGER FINGER RELEASE         Family History   Problem Relation Age of Onset   • Heart disease Mother    • Diabetes Mother    • Hypertension Mother        Social History     Social History   • Marital status:      Social History Main Topics   • Smoking status: Never Smoker   • Smokeless tobacco: Never Used   • Alcohol use No   • Drug use: No     Other Topics Concern   • Not on file           Objective   Physical Exam   Constitutional: She is oriented to person, place, and time. She appears well-developed and well-nourished.   HENT:   Head: Normocephalic.   Right Ear: External ear normal.   Left Ear: External ear normal.   Nose: Nose normal.   Mouth/Throat: Oropharynx is clear and moist. No oropharyngeal exudate.   Clear fluid behind both ears    Bilateral enlarged nasal turbinates .   Eyes: Pupils are equal, round, and reactive to light. Conjunctivae and EOM are normal.   Neck: Normal range of motion. Neck supple.   Cardiovascular: Normal rate, regular rhythm, normal heart sounds and intact distal pulses.    Pulmonary/Chest: Effort normal and breath sounds normal. No respiratory distress. She has no wheezes. She has no rales. She exhibits no tenderness.   Abdominal: Soft. Bowel sounds are normal. She exhibits no distension.   Musculoskeletal: Normal range of motion.   Neurological: She is alert and oriented to person, place, and time.   Skin: Skin is warm. Capillary refill takes less than 2 seconds.   Psychiatric: She has a normal mood and affect.   Vitals reviewed.      Procedures           ED Course       XR Chest 2 View   ED Interpretation   No acute process        Labs Reviewed   RAPID STREP A SCREEN - Normal   BETA HEMOLYTIC STREP CULTURE, THROAT     Well appearing non toxic, afebrile, able to handle her secretions. Will treat symptomatically.             MDM      Final diagnoses:   Pharyngitis, unspecified etiology   Upper respiratory tract infection, unspecified type            Jose Messina MD  10/05/18 0602

## 2018-10-07 LAB — BACTERIA SPEC AEROBE CULT: NORMAL

## 2018-12-29 ENCOUNTER — TRANSCRIBE ORDERS (OUTPATIENT)
Dept: ADMINISTRATIVE | Facility: HOSPITAL | Age: 58
End: 2018-12-29

## 2018-12-29 ENCOUNTER — LAB (OUTPATIENT)
Dept: LAB | Facility: HOSPITAL | Age: 58
End: 2018-12-29

## 2018-12-29 DIAGNOSIS — R05.9 COUGH: Primary | ICD-10-CM

## 2018-12-29 DIAGNOSIS — R05.9 COUGH: ICD-10-CM

## 2018-12-29 LAB
FLUAV AG NPH QL: NEGATIVE
FLUBV AG NPH QL IA: NEGATIVE
S PYO AG THROAT QL: NEGATIVE

## 2018-12-29 PROCEDURE — 87081 CULTURE SCREEN ONLY: CPT | Performed by: NURSE PRACTITIONER

## 2018-12-29 PROCEDURE — 87880 STREP A ASSAY W/OPTIC: CPT

## 2018-12-29 PROCEDURE — 87804 INFLUENZA ASSAY W/OPTIC: CPT

## 2018-12-31 LAB — BACTERIA SPEC AEROBE CULT: NORMAL

## 2019-12-07 ENCOUNTER — APPOINTMENT (OUTPATIENT)
Dept: GENERAL RADIOLOGY | Age: 59
End: 2019-12-07
Payer: COMMERCIAL

## 2019-12-07 ENCOUNTER — HOSPITAL ENCOUNTER (EMERGENCY)
Age: 59
Discharge: HOME | End: 2019-12-07
Payer: COMMERCIAL

## 2019-12-07 VITALS
RESPIRATION RATE: 20 BRPM | TEMPERATURE: 99.1 F | WEIGHT: 159 LBS | HEART RATE: 86 BPM | SYSTOLIC BLOOD PRESSURE: 155 MMHG | DIASTOLIC BLOOD PRESSURE: 77 MMHG | OXYGEN SATURATION: 95 % | BODY MASS INDEX: 28.17 KG/M2 | HEIGHT: 63 IN

## 2019-12-07 DIAGNOSIS — M25.532 WRIST PAIN, ACUTE, LEFT: Primary | ICD-10-CM

## 2019-12-07 PROCEDURE — 73110 X-RAY EXAM OF WRIST: CPT

## 2019-12-07 PROCEDURE — 99283 EMERGENCY DEPT VISIT LOW MDM: CPT

## 2019-12-07 PROCEDURE — 6370000000 HC RX 637 (ALT 250 FOR IP): Performed by: NURSE PRACTITIONER

## 2019-12-07 RX ORDER — GLIMEPIRIDE 4 MG/1
4 TABLET ORAL
COMMUNITY

## 2019-12-07 RX ORDER — HYDROCODONE BITARTRATE AND ACETAMINOPHEN 5; 325 MG/1; MG/1
1 TABLET ORAL EVERY 6 HOURS PRN
Qty: 10 TABLET | Refills: 0 | Status: SHIPPED | OUTPATIENT
Start: 2019-12-07 | End: 2019-12-14

## 2019-12-07 RX ORDER — HYDROCODONE BITARTRATE AND ACETAMINOPHEN 7.5; 325 MG/1; MG/1
1 TABLET ORAL ONCE
Status: COMPLETED | OUTPATIENT
Start: 2019-12-07 | End: 2019-12-07

## 2019-12-07 RX ADMIN — HYDROCODONE BITARTRATE AND ACETAMINOPHEN 1 TABLET: 7.5; 325 TABLET ORAL at 19:06

## 2019-12-07 ASSESSMENT — PAIN SCALES - GENERAL
PAINLEVEL_OUTOF10: 10
PAINLEVEL_OUTOF10: 10

## 2024-02-28 ENCOUNTER — TRANSCRIBE ORDERS (OUTPATIENT)
Dept: ADMINISTRATIVE | Age: 64
End: 2024-02-28

## 2024-02-28 DIAGNOSIS — R92.8 ABNORMAL MAMMOGRAM: Primary | ICD-10-CM

## 2024-03-01 ENCOUNTER — HOSPITAL ENCOUNTER (OUTPATIENT)
Dept: WOMENS IMAGING | Age: 64
Discharge: HOME | End: 2024-03-01
Payer: MEDICARE

## 2024-03-01 DIAGNOSIS — R92.8 ABNORMAL MAMMOGRAM: ICD-10-CM

## 2024-03-01 PROCEDURE — 77065 DX MAMMO INCL CAD UNI: CPT

## 2024-03-01 PROCEDURE — 2709999900 MAM STEREO BREAST BX W LOC DEVICE 1ST LESION LEFT

## 2024-03-05 ENCOUNTER — TELEPHONE (OUTPATIENT)
Dept: OTHER | Age: 64
End: 2024-03-05

## 2024-03-05 NOTE — TELEPHONE ENCOUNTER
Reached out to patient via telephone regarding positive pathology results. She had not had a phone call from Dr. Glaser's office regarding this. She had already had a referral placed by Dr. Glaser's office before her biopsy results came back. I've reached out to Donna at Dr. Browning's office to have that appointment moved up, due to a positive path result. We discussed her pathology and what to expect next as far as time line of events that will be taking place. I gave her my contact information so she could reach out at any time with questions. I will place a breast treatment handbook in the mail to her.

## 2024-03-06 ENCOUNTER — OFFICE VISIT (OUTPATIENT)
Dept: SURGERY | Age: 64
End: 2024-03-06

## 2024-03-06 VITALS
HEIGHT: 63 IN | WEIGHT: 174.8 LBS | BODY MASS INDEX: 30.97 KG/M2 | SYSTOLIC BLOOD PRESSURE: 140 MMHG | DIASTOLIC BLOOD PRESSURE: 74 MMHG

## 2024-03-06 DIAGNOSIS — D05.12 DUCTAL CARCINOMA IN SITU (DCIS) OF LEFT BREAST: Primary | ICD-10-CM

## 2024-03-06 RX ORDER — DIAZEPAM 10 MG/1
10 TABLET ORAL EVERY 6 HOURS PRN
COMMUNITY

## 2024-03-06 RX ORDER — LORATADINE 10 MG/1
10 TABLET ORAL DAILY
COMMUNITY

## 2024-03-06 RX ORDER — GLIPIZIDE 10 MG/1
10 TABLET, FILM COATED, EXTENDED RELEASE ORAL 2 TIMES DAILY
COMMUNITY
Start: 2024-03-04

## 2024-03-06 RX ORDER — LOSARTAN POTASSIUM 25 MG/1
25 TABLET ORAL DAILY
COMMUNITY

## 2024-03-06 RX ORDER — METFORMIN HYDROCHLORIDE 500 MG/1
500 TABLET, EXTENDED RELEASE ORAL 2 TIMES DAILY
COMMUNITY

## 2024-03-06 RX ORDER — IBUPROFEN 200 MG
200 TABLET ORAL EVERY 6 HOURS PRN
COMMUNITY

## 2024-03-06 RX ORDER — CHOLECALCIFEROL (VITAMIN D3) 1250 MCG
50000 CAPSULE ORAL DAILY
COMMUNITY

## 2024-03-06 RX ORDER — PIOGLITAZONEHYDROCHLORIDE 30 MG/1
30 TABLET ORAL DAILY
COMMUNITY
Start: 2024-03-04

## 2024-03-06 RX ORDER — DOXYCYCLINE HYCLATE 100 MG/1
CAPSULE ORAL
COMMUNITY
Start: 2024-02-19

## 2024-03-06 RX ORDER — TIRZEPATIDE 10 MG/.5ML
INJECTION, SOLUTION SUBCUTANEOUS
COMMUNITY
Start: 2024-01-23

## 2024-03-06 NOTE — PROGRESS NOTES
Patient was informed:    MRI scheduled on 3/11/2024 at 11 Am to arrive at 10:30 Am. All instructions for test were given.  Breast US Scheduled before breast talk at [facility] on 3/20/2024 @ 4 PM. She has a Breast talk with Dr. Browning at 5 PM.    
    partial    KNEE SURGERY Bilateral     x2-meniscus    [facility] BREAST BIOPSY LEFT Left 03/01/2024    [facility] BREAST BIOPSY LEFT [facility]    OTHER SURGICAL HISTORY      cardiac ablation    TONSILLECTOMY         Family History   Problem Relation Age of Onset    High Blood Pressure Mother     Diabetes Mother     Heart Disease Mother     Cancer Maternal Grandmother         colon    Breast Cancer Paternal Grandmother     Diabetes Paternal Grandfather     Colon Cancer Neg Hx     Colon Polyps Neg Hx     Esophageal Cancer Neg Hx     Liver Cancer Neg Hx     Liver Disease Neg Hx     Rectal Cancer Neg Hx     Stomach Cancer Neg Hx        Social History     Tobacco Use    Smoking status: Never    Smokeless tobacco: Never   Substance Use Topics    Alcohol use: No          ROS  review of system reviewed and positive for the above all other systems noted to be negative      Mammogram      Pathology  Breast, biopsy of left breast upper outer calcifications:   1.  High-grade ductal carcinoma in situ.   2.  In situ carcinoma measures 0.7 cm in greatest dimension and is   present in multiple cores.   3.  Microcalcifications are identified.     COMMENT:    A representative specimen has been submitted for estrogen   receptor analysis and that result will follow separately.     CBG/CBG      Physical Exam  Blood pressure (!) 140/74, height 1.6 m (5' 3\"), weight 79.3 kg (174 lb 12.8 oz).     Constitutional:  This is a 63 y.o.female that appears to be in no acute distress.  She is pleasant and answers questions appropriately.    Breast:  On examination to her breast, patient has no dominant palpable masses.  She has fibrocystic changes throughout both breast.  Breast biopsy site healing with some minor skin irritation to the adhesive.  There is no appreciable skin dimpling.  There is no axillary adenopathy or supraclavicular adenopathy appreciable.      Impression  DCIS left breast    Plan  We did

## 2024-03-07 NOTE — PROGRESS NOTES
HISTORY OF PRESENT ILLNESS:    Ms. Michel  is recently status post stereotactic breast biopsy  on the left which revealed a high grade ductal carcinoma in situ. ER positive at 94%. Prelude DX is elevated at 9.2    MRI-3/11/2024  Right breast:  There are no enhancing masses or areas of nonmass enhancement. There is no axillary or internal mammary lymphadenopathy.     Left breast:   There is 5.8 x 0.9 x 2.3 cm of clumped segmental non mass enhancement surrounding and encompassing a biopsy marker clip in the upper outer quadrant of the left breast at 2 o'clock middle depth, corresponding to the site of biopsy-proven   malignancy. The posterior extent of enhancement is located 1.6 cm anterior to the pectoralis muscle. There is no axillary or internal mammary lymphadenopathy.     Other:  Heterogeneous, nodular thyroid with asymmetrically enlarged right lobe.     IMPRESSION:  1.  Suspicious non mass enhancement at the site of biopsy-proven malignancy in the left breast extends up to 5.8 cm, corresponding to the calcifications on the mammogram, which could be targeted for bracket localization if breast conservation therapy is   pursued.  2.  No MRI evidence of malignancy in the right breast.     BI-RADS 6 - KNOWN MALIGNANCY     Recommendation:  Recommend continued surgical/oncologic management.  Electronically signed by: JULIAN MARSHALL M.D.    DISCUSSION:  I had a lengthy discussion with Ms. Michel  and her family about the ramifications of the diagnosis of breast cancer.  We discussed the pathophysiology of cancer in general and also the ways in which surgery, radiation therapy, and chemotherapy are utilized in the treatment of different types of cancers.  We also explained how these modalities related to her situation in particular.  We discussed the pathophysiology of breast cancer and some length, including what is known about the causes of breast cancer, its relationship to fibrocystic disease, its relationship

## 2024-03-11 ENCOUNTER — HOSPITAL ENCOUNTER (OUTPATIENT)
Dept: MRI IMAGING | Age: 64
Discharge: HOME | End: 2024-03-11
Payer: MEDICARE

## 2024-03-11 DIAGNOSIS — D05.12 DUCTAL CARCINOMA IN SITU (DCIS) OF LEFT BREAST: ICD-10-CM

## 2024-03-11 PROCEDURE — C8908 MRI W/O FOL W/CONT, BREAST,: HCPCS

## 2024-03-11 PROCEDURE — 6360000004 HC RX CONTRAST MEDICATION: Performed by: PHYSICIAN ASSISTANT

## 2024-03-11 PROCEDURE — A9577 INJ MULTIHANCE: HCPCS | Performed by: PHYSICIAN ASSISTANT

## 2024-03-11 RX ADMIN — GADOBENATE DIMEGLUMINE 16 ML: 529 INJECTION, SOLUTION INTRAVENOUS at 11:51

## 2024-03-13 ENCOUNTER — TELEPHONE (OUTPATIENT)
Dept: SURGERY | Age: 64
End: 2024-03-13

## 2024-03-13 NOTE — TELEPHONE ENCOUNTER
Discussed MRI results.   Awaiting Prelude DX.  We will see her next week to discuss surgical options.    This has been electronically signed by Omi Stephens PA-C.

## 2024-03-20 ENCOUNTER — INITIAL CONSULT (OUTPATIENT)
Dept: SURGERY | Age: 64
End: 2024-03-20
Payer: MEDICARE

## 2024-03-20 ENCOUNTER — HOSPITAL ENCOUNTER (OUTPATIENT)
Dept: WOMENS IMAGING | Age: 64
Discharge: HOME | End: 2024-03-20
Payer: MEDICARE

## 2024-03-20 DIAGNOSIS — D05.12 DUCTAL CARCINOMA IN SITU (DCIS) OF LEFT BREAST: Primary | ICD-10-CM

## 2024-03-20 DIAGNOSIS — D05.12 DUCTAL CARCINOMA IN SITU (DCIS) OF LEFT BREAST: ICD-10-CM

## 2024-03-20 PROCEDURE — G8417 CALC BMI ABV UP PARAM F/U: HCPCS | Performed by: SURGERY

## 2024-03-20 PROCEDURE — G2212 PROLONG OUTPT/OFFICE VIS: HCPCS | Performed by: SURGERY

## 2024-03-20 PROCEDURE — 1090F PRES/ABSN URINE INCON ASSESS: CPT | Performed by: SURGERY

## 2024-03-20 PROCEDURE — 76642 ULTRASOUND BREAST LIMITED: CPT

## 2024-03-20 PROCEDURE — G8484 FLU IMMUNIZE NO ADMIN: HCPCS | Performed by: SURGERY

## 2024-03-20 PROCEDURE — 99215 OFFICE O/P EST HI 40 MIN: CPT | Performed by: SURGERY

## 2024-03-20 PROCEDURE — G8400 PT W/DXA NO RESULTS DOC: HCPCS | Performed by: SURGERY

## 2024-03-20 PROCEDURE — 1123F ACP DISCUSS/DSCN MKR DOCD: CPT | Performed by: SURGERY

## 2024-03-20 PROCEDURE — G8428 CUR MEDS NOT DOCUMENT: HCPCS | Performed by: SURGERY

## 2024-03-20 PROCEDURE — 4004F PT TOBACCO SCREEN RCVD TLK: CPT | Performed by: SURGERY

## 2024-03-20 PROCEDURE — 3017F COLORECTAL CA SCREEN DOC REV: CPT | Performed by: SURGERY

## 2024-03-21 DIAGNOSIS — D05.12 DUCTAL CARCINOMA IN SITU (DCIS) OF LEFT BREAST: Primary | ICD-10-CM

## 2024-03-25 ENCOUNTER — HOSPITAL ENCOUNTER (OUTPATIENT)
Dept: PREADMISSION TESTING | Age: 64
Discharge: HOME | End: 2024-03-29
Payer: MEDICARE

## 2024-03-25 VITALS — WEIGHT: 172 LBS | BODY MASS INDEX: 30.47 KG/M2

## 2024-03-25 LAB
ANION GAP SERPL CALCULATED.3IONS-SCNC: 14 MMOL/L (ref 7–19)
BASOPHILS # BLD: 0 K/UL (ref 0–0.2)
BASOPHILS NFR BLD: 0.5 % (ref 0–1)
BUN SERPL-MCNC: 15 MG/DL (ref 8–23)
CALCIUM SERPL-MCNC: 10.3 MG/DL (ref 8.8–10.2)
CHLORIDE SERPL-SCNC: 104 MMOL/L (ref 98–111)
CO2 SERPL-SCNC: 25 MMOL/L (ref 22–29)
CREAT SERPL-MCNC: 0.6 MG/DL (ref 0.5–0.9)
EKG P AXIS: 29 DEGREES
EKG P-R INTERVAL: 152 MS
EKG Q-T INTERVAL: 366 MS
EKG QRS DURATION: 86 MS
EKG QTC CALCULATION (BAZETT): 401 MS
EKG T AXIS: 12 DEGREES
EOSINOPHIL # BLD: 0.1 K/UL (ref 0–0.6)
EOSINOPHIL NFR BLD: 2.3 % (ref 0–5)
ERYTHROCYTE [DISTWIDTH] IN BLOOD BY AUTOMATED COUNT: 13.8 % (ref 11.5–14.5)
GLUCOSE SERPL-MCNC: 132 MG/DL (ref 74–109)
HCT VFR BLD AUTO: 41.3 % (ref 37–47)
HGB BLD-MCNC: 13.1 G/DL (ref 12–16)
IMM GRANULOCYTES # BLD: 0 K/UL
LYMPHOCYTES # BLD: 1.6 K/UL (ref 1.1–4.5)
LYMPHOCYTES NFR BLD: 25 % (ref 20–40)
MCH RBC QN AUTO: 29.4 PG (ref 27–31)
MCHC RBC AUTO-ENTMCNC: 31.7 G/DL (ref 33–37)
MCV RBC AUTO: 92.6 FL (ref 81–99)
MONOCYTES # BLD: 0.5 K/UL (ref 0–0.9)
MONOCYTES NFR BLD: 8.2 % (ref 0–10)
NEUTROPHILS # BLD: 3.9 K/UL (ref 1.5–7.5)
NEUTS SEG NFR BLD: 63.4 % (ref 50–65)
PLATELET # BLD AUTO: 280 K/UL (ref 130–400)
PMV BLD AUTO: 8.9 FL (ref 9.4–12.3)
POTASSIUM SERPL-SCNC: 4.3 MMOL/L (ref 3.5–5)
RBC # BLD AUTO: 4.46 M/UL (ref 4.2–5.4)
SODIUM SERPL-SCNC: 143 MMOL/L (ref 136–145)
WBC # BLD AUTO: 6.2 K/UL (ref 4.8–10.8)

## 2024-03-25 PROCEDURE — 93010 ELECTROCARDIOGRAM REPORT: CPT | Performed by: INTERNAL MEDICINE

## 2024-03-25 PROCEDURE — 93005 ELECTROCARDIOGRAM TRACING: CPT | Performed by: SURGERY

## 2024-03-25 PROCEDURE — 80048 BASIC METABOLIC PNL TOTAL CA: CPT

## 2024-03-25 PROCEDURE — 85025 COMPLETE CBC W/AUTO DIFF WBC: CPT

## 2024-03-25 RX ORDER — CELECOXIB 200 MG/1
200 CAPSULE ORAL ONCE
Status: CANCELLED | OUTPATIENT
Start: 2024-03-28

## 2024-03-25 RX ORDER — MONTELUKAST SODIUM 10 MG/1
10 TABLET ORAL DAILY
COMMUNITY

## 2024-03-25 RX ORDER — GABAPENTIN 300 MG/1
300 CAPSULE ORAL ONCE
Status: CANCELLED | OUTPATIENT
Start: 2024-03-28

## 2024-03-25 RX ORDER — ACETAMINOPHEN 325 MG/1
975 TABLET ORAL ONCE
Status: CANCELLED | OUTPATIENT
Start: 2024-03-28

## 2024-03-25 RX ORDER — TAMOXIFEN CITRATE 10 MG/1
20 TABLET ORAL DAILY
COMMUNITY

## 2024-03-25 NOTE — DISCHARGE INSTRUCTIONS
can increase your risk of post op infection.          [facility] Visitor Policy for Surgery Patients-Revised 6-    Visitors for surgery patients are essential for the patient's emotional well-being and care       post operatively.    2.   Visitor Expectations and Limitations      3.  One visitor allowed with patients in the preop/postop rooms.    4.  A second visitor may sit in the waiting area.    5.  No children under 13 allowed in the pre-post op areas unless they are the patient.    6.  Two people may be with an underage surgical/procedural patient in preop/postop        room.      7.  If you are admitted to the hospital post operatively, there are NO RESTRICTIONS on       the floor at this time.      8.  If you are admitted to ICU postoperatively, you may have one visitor in the room from        7A-7P.  A second visitor may sit in the ICU waiting room.  No overnight visitors in         ICU waiting room.

## 2024-03-27 ENCOUNTER — HOSPITAL ENCOUNTER (OUTPATIENT)
Dept: ULTRASOUND IMAGING | Age: 64
Discharge: HOME | End: 2024-03-27
Attending: SURGERY
Payer: MEDICARE

## 2024-03-27 DIAGNOSIS — D05.12 DUCTAL CARCINOMA IN SITU (DCIS) OF LEFT BREAST: ICD-10-CM

## 2024-03-27 PROCEDURE — 76642 ULTRASOUND BREAST LIMITED: CPT

## 2024-03-28 ENCOUNTER — APPOINTMENT (OUTPATIENT)
Dept: ULTRASOUND IMAGING | Age: 64
End: 2024-03-28
Attending: SURGERY
Payer: MEDICARE

## 2024-03-28 ENCOUNTER — HOSPITAL ENCOUNTER (OUTPATIENT)
Age: 64
Setting detail: OUTPATIENT SURGERY
Discharge: HOME | End: 2024-03-28
Attending: SURGERY | Admitting: SURGERY
Payer: MEDICARE

## 2024-03-28 ENCOUNTER — ANESTHESIA EVENT (OUTPATIENT)
Dept: OPERATING ROOM | Age: 64
End: 2024-03-28
Payer: MEDICARE

## 2024-03-28 ENCOUNTER — ANESTHESIA (OUTPATIENT)
Dept: OPERATING ROOM | Age: 64
End: 2024-03-28
Payer: MEDICARE

## 2024-03-28 VITALS
SYSTOLIC BLOOD PRESSURE: 145 MMHG | DIASTOLIC BLOOD PRESSURE: 75 MMHG | HEIGHT: 63 IN | BODY MASS INDEX: 30.48 KG/M2 | OXYGEN SATURATION: 95 % | RESPIRATION RATE: 16 BRPM | HEART RATE: 76 BPM | WEIGHT: 172 LBS | TEMPERATURE: 97.4 F

## 2024-03-28 DIAGNOSIS — D05.12 DUCTAL CARCINOMA IN SITU (DCIS) OF LEFT BREAST: ICD-10-CM

## 2024-03-28 DIAGNOSIS — D05.12 DUCTAL CARCINOMA IN SITU OF LEFT BREAST: Primary | ICD-10-CM

## 2024-03-28 LAB
GLUCOSE BLD-MCNC: 151 MG/DL (ref 70–99)
GLUCOSE BLD-MCNC: 158 MG/DL (ref 70–99)
PERFORMED ON: ABNORMAL
PERFORMED ON: ABNORMAL

## 2024-03-28 PROCEDURE — 0546T RF SPECTRSC NTRAOP MRGN ASMT: CPT | Performed by: SURGERY

## 2024-03-28 PROCEDURE — 6370000000 HC RX 637 (ALT 250 FOR IP): Performed by: SURGERY

## 2024-03-28 PROCEDURE — 3600000004 HC SURGERY LEVEL 4 BASE: Performed by: SURGERY

## 2024-03-28 PROCEDURE — 2580000003 HC RX 258: Performed by: ANESTHESIOLOGY

## 2024-03-28 PROCEDURE — 2500000003 HC RX 250 WO HCPCS: Performed by: SURGERY

## 2024-03-28 PROCEDURE — 88305 TISSUE EXAM BY PATHOLOGIST: CPT

## 2024-03-28 PROCEDURE — 7100000001 HC PACU RECOVERY - ADDTL 15 MIN: Performed by: SURGERY

## 2024-03-28 PROCEDURE — 2720000010 HC SURG SUPPLY STERILE: Performed by: SURGERY

## 2024-03-28 PROCEDURE — 6360000002 HC RX W HCPCS: Performed by: SURGERY

## 2024-03-28 PROCEDURE — 19301 PARTIAL MASTECTOMY: CPT | Performed by: SURGERY

## 2024-03-28 PROCEDURE — 7100000000 HC PACU RECOVERY - FIRST 15 MIN: Performed by: SURGERY

## 2024-03-28 PROCEDURE — 88342 IMHCHEM/IMCYTCHM 1ST ANTB: CPT

## 2024-03-28 PROCEDURE — 7100000010 HC PHASE II RECOVERY - FIRST 15 MIN: Performed by: SURGERY

## 2024-03-28 PROCEDURE — 19294 PREPJ TUM CAV IORT PRTL MAST: CPT | Performed by: SURGERY

## 2024-03-28 PROCEDURE — 3700000000 HC ANESTHESIA ATTENDED CARE: Performed by: SURGERY

## 2024-03-28 PROCEDURE — 19340 INSJ BREAST IMPLT SM D MAST: CPT | Performed by: SURGERY

## 2024-03-28 PROCEDURE — A4217 STERILE WATER/SALINE, 500 ML: HCPCS | Performed by: SURGERY

## 2024-03-28 PROCEDURE — 88307 TISSUE EXAM BY PATHOLOGIST: CPT

## 2024-03-28 PROCEDURE — 3700000001 HC ADD 15 MINUTES (ANESTHESIA): Performed by: SURGERY

## 2024-03-28 PROCEDURE — 2580000003 HC RX 258: Performed by: NURSE ANESTHETIST, CERTIFIED REGISTERED

## 2024-03-28 PROCEDURE — 76998 US GUIDE INTRAOP: CPT | Performed by: SURGERY

## 2024-03-28 PROCEDURE — 6360000002 HC RX W HCPCS: Performed by: NURSE ANESTHETIST, CERTIFIED REGISTERED

## 2024-03-28 PROCEDURE — C1728 CATH, BRACHYTX SEED ADM: HCPCS | Performed by: SURGERY

## 2024-03-28 PROCEDURE — 2709999900 HC NON-CHARGEABLE SUPPLY: Performed by: SURGERY

## 2024-03-28 PROCEDURE — 2580000003 HC RX 258: Performed by: SURGERY

## 2024-03-28 PROCEDURE — C1819 TISSUE LOCALIZATION-EXCISION: HCPCS | Performed by: SURGERY

## 2024-03-28 PROCEDURE — 19297 PLACE BREAST CATH FOR RAD: CPT | Performed by: SURGERY

## 2024-03-28 PROCEDURE — 82962 GLUCOSE BLOOD TEST: CPT

## 2024-03-28 PROCEDURE — 2500000003 HC RX 250 WO HCPCS: Performed by: NURSE ANESTHETIST, CERTIFIED REGISTERED

## 2024-03-28 PROCEDURE — 19285 PERQ DEV BREAST 1ST US IMAG: CPT

## 2024-03-28 PROCEDURE — C1713 ANCHOR/SCREW BN/BN,TIS/BN: HCPCS | Performed by: SURGERY

## 2024-03-28 PROCEDURE — 3600000014 HC SURGERY LEVEL 4 ADDTL 15MIN: Performed by: SURGERY

## 2024-03-28 PROCEDURE — A4648 IMPLANTABLE TISSUE MARKER: HCPCS | Performed by: SURGERY

## 2024-03-28 PROCEDURE — 7100000011 HC PHASE II RECOVERY - ADDTL 15 MIN: Performed by: SURGERY

## 2024-03-28 PROCEDURE — 77424 IO RAD TX DELIVERY BY X-RAY: CPT | Performed by: SURGERY

## 2024-03-28 DEVICE — MARKER SURG FIDUCIAL 2X3X1 CM SPACER CLP SFT TISS BIOZORB: Type: IMPLANTABLE DEVICE | Site: BREAST | Status: FUNCTIONAL

## 2024-03-28 RX ORDER — SODIUM CHLORIDE, SODIUM LACTATE, POTASSIUM CHLORIDE, CALCIUM CHLORIDE 600; 310; 30; 20 MG/100ML; MG/100ML; MG/100ML; MG/100ML
INJECTION, SOLUTION INTRAVENOUS CONTINUOUS
Status: DISCONTINUED | OUTPATIENT
Start: 2024-03-28 | End: 2024-03-28 | Stop reason: HOSPADM

## 2024-03-28 RX ORDER — LABETALOL HYDROCHLORIDE 5 MG/ML
10 INJECTION, SOLUTION INTRAVENOUS
Status: DISCONTINUED | OUTPATIENT
Start: 2024-03-28 | End: 2024-03-28 | Stop reason: HOSPADM

## 2024-03-28 RX ORDER — PROPOFOL 10 MG/ML
INJECTION, EMULSION INTRAVENOUS CONTINUOUS PRN
Status: DISCONTINUED | OUTPATIENT
Start: 2024-03-28 | End: 2024-03-28 | Stop reason: SDUPTHER

## 2024-03-28 RX ORDER — METOCLOPRAMIDE HYDROCHLORIDE 5 MG/ML
10 INJECTION INTRAMUSCULAR; INTRAVENOUS
Status: DISCONTINUED | OUTPATIENT
Start: 2024-03-28 | End: 2024-03-28 | Stop reason: HOSPADM

## 2024-03-28 RX ORDER — SODIUM CHLORIDE 0.9 % (FLUSH) 0.9 %
5-40 SYRINGE (ML) INJECTION PRN
Status: DISCONTINUED | OUTPATIENT
Start: 2024-03-28 | End: 2024-03-28 | Stop reason: HOSPADM

## 2024-03-28 RX ORDER — HYDROMORPHONE HYDROCHLORIDE 1 MG/ML
0.25 INJECTION, SOLUTION INTRAMUSCULAR; INTRAVENOUS; SUBCUTANEOUS EVERY 5 MIN PRN
Status: DISCONTINUED | OUTPATIENT
Start: 2024-03-28 | End: 2024-03-28 | Stop reason: HOSPADM

## 2024-03-28 RX ORDER — HYDROMORPHONE HYDROCHLORIDE 1 MG/ML
0.5 INJECTION, SOLUTION INTRAMUSCULAR; INTRAVENOUS; SUBCUTANEOUS EVERY 5 MIN PRN
Status: DISCONTINUED | OUTPATIENT
Start: 2024-03-28 | End: 2024-03-28 | Stop reason: HOSPADM

## 2024-03-28 RX ORDER — SODIUM CHLORIDE 9 MG/ML
INJECTION, SOLUTION INTRAVENOUS PRN
Status: DISCONTINUED | OUTPATIENT
Start: 2024-03-28 | End: 2024-03-28 | Stop reason: HOSPADM

## 2024-03-28 RX ORDER — SODIUM CHLORIDE, SODIUM LACTATE, POTASSIUM CHLORIDE, CALCIUM CHLORIDE 600; 310; 30; 20 MG/100ML; MG/100ML; MG/100ML; MG/100ML
INJECTION, SOLUTION INTRAVENOUS CONTINUOUS PRN
Status: DISCONTINUED | OUTPATIENT
Start: 2024-03-28 | End: 2024-03-28 | Stop reason: SDUPTHER

## 2024-03-28 RX ORDER — ONDANSETRON 2 MG/ML
4 INJECTION INTRAMUSCULAR; INTRAVENOUS
Status: DISCONTINUED | OUTPATIENT
Start: 2024-03-28 | End: 2024-03-28 | Stop reason: HOSPADM

## 2024-03-28 RX ORDER — NALOXONE HYDROCHLORIDE 0.4 MG/ML
INJECTION, SOLUTION INTRAMUSCULAR; INTRAVENOUS; SUBCUTANEOUS PRN
Status: DISCONTINUED | OUTPATIENT
Start: 2024-03-28 | End: 2024-03-28 | Stop reason: HOSPADM

## 2024-03-28 RX ORDER — SODIUM CHLORIDE 0.9 % (FLUSH) 0.9 %
5-40 SYRINGE (ML) INJECTION EVERY 12 HOURS SCHEDULED
Status: DISCONTINUED | OUTPATIENT
Start: 2024-03-28 | End: 2024-03-28 | Stop reason: HOSPADM

## 2024-03-28 RX ORDER — DIPHENHYDRAMINE HYDROCHLORIDE 50 MG/ML
12.5 INJECTION INTRAMUSCULAR; INTRAVENOUS
Status: DISCONTINUED | OUTPATIENT
Start: 2024-03-28 | End: 2024-03-28 | Stop reason: HOSPADM

## 2024-03-28 RX ORDER — GABAPENTIN 300 MG/1
300 CAPSULE ORAL ONCE
Status: COMPLETED | OUTPATIENT
Start: 2024-03-28 | End: 2024-03-28

## 2024-03-28 RX ORDER — ACETAMINOPHEN 325 MG/1
975 TABLET ORAL ONCE
Status: COMPLETED | OUTPATIENT
Start: 2024-03-28 | End: 2024-03-28

## 2024-03-28 RX ORDER — LIDOCAINE HYDROCHLORIDE 10 MG/ML
INJECTION, SOLUTION INFILTRATION; PERINEURAL PRN
Status: DISCONTINUED | OUTPATIENT
Start: 2024-03-28 | End: 2024-03-28 | Stop reason: SDUPTHER

## 2024-03-28 RX ORDER — HYDROCODONE BITARTRATE AND ACETAMINOPHEN 5; 325 MG/1; MG/1
1 TABLET ORAL EVERY 6 HOURS PRN
Qty: 8 TABLET | Refills: 0 | Status: SHIPPED | OUTPATIENT
Start: 2024-03-28 | End: 2024-03-31

## 2024-03-28 RX ORDER — HYDRALAZINE HYDROCHLORIDE 20 MG/ML
10 INJECTION INTRAMUSCULAR; INTRAVENOUS
Status: DISCONTINUED | OUTPATIENT
Start: 2024-03-28 | End: 2024-03-28 | Stop reason: HOSPADM

## 2024-03-28 RX ORDER — CELECOXIB 200 MG/1
200 CAPSULE ORAL ONCE
Status: COMPLETED | OUTPATIENT
Start: 2024-03-28 | End: 2024-03-28

## 2024-03-28 RX ORDER — IPRATROPIUM BROMIDE AND ALBUTEROL SULFATE 2.5; .5 MG/3ML; MG/3ML
1 SOLUTION RESPIRATORY (INHALATION)
Status: DISCONTINUED | OUTPATIENT
Start: 2024-03-28 | End: 2024-03-28 | Stop reason: HOSPADM

## 2024-03-28 RX ADMIN — SODIUM CHLORIDE, POTASSIUM CHLORIDE, SODIUM LACTATE AND CALCIUM CHLORIDE: 600; 310; 30; 20 INJECTION, SOLUTION INTRAVENOUS at 09:18

## 2024-03-28 RX ADMIN — PROPOFOL 40 MG: 10 INJECTION, EMULSION INTRAVENOUS at 12:09

## 2024-03-28 RX ADMIN — LIDOCAINE HYDROCHLORIDE 50 MG: 10 INJECTION, SOLUTION INFILTRATION; PERINEURAL at 11:35

## 2024-03-28 RX ADMIN — CELECOXIB 200 MG: 200 CAPSULE ORAL at 09:22

## 2024-03-28 RX ADMIN — ACETAMINOPHEN 975 MG: 325 TABLET ORAL at 09:22

## 2024-03-28 RX ADMIN — PROPOFOL 200 MCG/KG/MIN: 10 INJECTION, EMULSION INTRAVENOUS at 11:35

## 2024-03-28 RX ADMIN — PROPOFOL 40 MG: 10 INJECTION, EMULSION INTRAVENOUS at 11:58

## 2024-03-28 RX ADMIN — SODIUM CHLORIDE, SODIUM LACTATE, POTASSIUM CHLORIDE, AND CALCIUM CHLORIDE: 600; 310; 30; 20 INJECTION, SOLUTION INTRAVENOUS at 11:33

## 2024-03-28 RX ADMIN — SODIUM CHLORIDE, SODIUM LACTATE, POTASSIUM CHLORIDE, AND CALCIUM CHLORIDE: 600; 310; 30; 20 INJECTION, SOLUTION INTRAVENOUS at 12:01

## 2024-03-28 RX ADMIN — GABAPENTIN 300 MG: 300 CAPSULE ORAL at 09:22

## 2024-03-28 RX ADMIN — CEFAZOLIN 2000 MG: 2 INJECTION, POWDER, FOR SOLUTION INTRAMUSCULAR; INTRAVENOUS at 11:40

## 2024-03-28 ASSESSMENT — PAIN - FUNCTIONAL ASSESSMENT
PAIN_FUNCTIONAL_ASSESSMENT: NONE - DENIES PAIN

## 2024-03-28 ASSESSMENT — LIFESTYLE VARIABLES: SMOKING_STATUS: 0

## 2024-03-28 NOTE — PROGRESS NOTES
CLINICAL PHARMACY NOTE: MEDS TO BEDS    Total # of Prescriptions Filled: 1   The following medications were delivered to the patient:  Current Discharge Medication List        START taking these medications    Details   HYDROcodone-acetaminophen (NORCO) 5-325 MG per tablet Take 1 tablet by mouth every 6 hours as needed for Pain for up to 3 days. Max Daily Amount: 4 tablets  Qty: 8 tablet, Refills: 0    Associated Diagnoses: Ductal carcinoma in situ (DCIS) of left breast; Ductal carcinoma in situ of left breast               Additional Documentation:  Handed to patients family at [facility]. Paid with cash.

## 2024-03-28 NOTE — DISCHARGE INSTRUCTIONS
of the drainage.     The tube is coming loose where it leaves your skin.   Watch closely for changes in your health, and be sure to contact your doctor if:    You see a lot of fluid around the drain.     You cannot remove a clot from the tube by milking the tube.   Where can you learn more?  Go to https://[email or web address].net/patientEd and enter K117 to learn more about \"Surgical Drain Care: Care Instructions.\"  Current as of: July 26, 2023               Content Version: 14.0  © 2006-2024 [employer].   Care instructions adapted under license by [employer]. If you have questions about a medical condition or this instruction, always ask your healthcare professional. [employer] disclaims any warranty or liability for your use of this information.

## 2024-03-28 NOTE — ANESTHESIA POSTPROCEDURE EVALUATION
Department of Anesthesiology  Postprocedure Note    Patient: Mariela Michel  MRN: [id number]  YOB: 1960  Date of evaluation: 3/28/2024    Procedure Summary       Date: 03/28/24 Room / Location: [address]    Anesthesia Start: 1133 Anesthesia Stop: 1518    Procedure: LEFT LUMPECTOMY, PREOP ULTRASOUND AND INTRAOPERATIVE ULTRASOUND GUIDED NEEDLE LOCALIZATION, PEC BLOCK, MARGINPROBE, BIOZORB, FLAPS, IORT (Left: Breast) Diagnosis:       Ductal carcinoma in situ of left breast      (Ductal carcinoma in situ of left breast [D05.12])    Surgeons: Madi Browning MD Responsible Provider: Singh Lindsey APRN - CRNA    Anesthesia Type: MAC ASA Status: 2            Anesthesia Type: No value filed.    Amada Phase I: Amada Score: 8    Amada Phase II:      Anesthesia Post Evaluation    Patient location during evaluation: PACU  Patient participation: complete - patient participated  Level of consciousness: sleepy but conscious  Pain score: 0  Airway patency: patent  Nausea & Vomiting: no nausea and no vomiting  Cardiovascular status: blood pressure returned to baseline  Respiratory status: acceptable  Pain management: adequate        No notable events documented.

## 2024-03-28 NOTE — ANESTHESIA PRE PROCEDURE
and not a current smoker                           Cardiovascular:  Exercise tolerance: good (>4 METS)  (+) dysrhythmias (s/p ablation): atrial fibrillation    (-) pacemaker, hypertension (denies HTN, on lisinopril), past MI, CAD, CABG/stent and no hyperlipidemia    ECG reviewed  Rhythm: regular  Rate: normal                    Neuro/Psych:      (-) seizures and CVA           GI/Hepatic/Renal:        (-) GERD, liver disease and no renal disease       Endo/Other:    (+) DiabetesType II DM, malignancy/cancer (breast).    (-) hypothyroidism, hyperthyroidism               Abdominal:             Vascular: negative vascular ROS.         Other Findings:             Anesthesia Plan      MAC     ASA 2     (Discussed conversion to GA if required)  Induction: intravenous.    MIPS: Postoperative opioids intended and Prophylactic antiemetics administered.  Anesthetic plan and risks discussed with patient.    Use of blood products discussed with patient whom consented to blood products.      Attending anesthesiologist reviewed and agrees with Preprocedure content                Cynthia Herrera MD   3/28/2024

## 2024-03-28 NOTE — BRIEF OP NOTE
Brief Postoperative Note      DATE OF PROCEDURE: 3/28/2024     SURGEON: Madi Browning MD    PREOPERATIVE DIAGNOSIS:  Ductal carcinoma in situ of left breast [D05.12]    POSTOPERATIVE DIAGNOSIS: Same     OPERATION: Procedure(s):  LEFT LUMPECTOMY, PREOP ULTRASOUND AND INTRAOPERATIVE ULTRASOUND GUIDED NEEDLE LOCALIZATION, PEC BLOCK, MARGINPROBE, BIOZORB, FLAPS, IORT    ANESTHESIA: Pec block/MAC  ESTIMATED BLOOD LOSS: Minimal    COMPLICATIONS: None.     SPECIMENS:   ID Type Source Tests Collected by Time Destination   A : left breast tissue Tissue Breast SURGICAL PATHOLOGY Madi Browning MD 3/28/2024 1223    B : left breast tissue-additional cranial margin Tissue Breast SURGICAL PATHOLOGY Madi Browning MD 3/28/2024 1231    C : left breast tissue-additional caudal margin Tissue Breast SURGICAL PATHOLOGY Madi Browning MD 3/28/2024 1236    D : left breast tissue-additional lateral margin Tissue Breast SURGICAL PATHOLOGY Madi Browning MD 3/28/2024 1236    E : left breast tissue-additional deep margin Tissue Breast SURGICAL PATHOLOGY Madi Browning MD 3/28/2024 1236        DRAINS: STUART    The patient tolerated the procedure well.    Electronically signed by Madi Browning MD  on 3/28/2024 at 1:58 PM

## 2024-03-28 NOTE — OP NOTE
Operative Note        Radiation Oncology      Patient's Name/Date of Birth:    Mariela Michel / 1960 (63 y.o.)    DATE OF OPERATION:    03/28/2024    SURGEON:    Madi Browning MD  Phone Number: [phone]    OTHER SURGEONS:      Surgeon(s):  Rony Tomlinson MD Howard, Daniel A, MD Howard, Daniel A, MD    PREOPERATIVE DIAGNOSIS:    D05.12    POSTOPERATIVE DIAGNOSIS:    D05.12    BRIEF HISTORY:    Ms. Michel is a 63 y.o. female with a history of abnormal screening mammogram at an outside facility showing increasing pleomorphic microcalcifications involving the left breast.     On 3/19/2024, stereotactic biopsy was performed showing high-grade ductal carcinoma in situ measuring 0.7 cm in greatest dimension and present in multiple cores.  Microcalcifications were identified.  ER was positive at 94%     MRI of the bilateral breasts performed 3/11/2024 showed a 5.8 x 0.9 x 2.3 cm clumped segmental non-mass enhancement surrounding a biopsy marker clip in the upper outer quadrant of the left breast.  No axillary or internal mammary lymphadenopathy was seen.  The right breast showed no enhancing masses or areas of non-mass enhancement.  No axillary or internal mammary lymphadenopathy was seen.     The range of treatment options was discussed with the patient by Dr. Madi Browning and she has elected to proceed with left breast lumpectomy with consideration of intraoperative radiation therapy utilizing the Xoft electronic brachytherapy system.     Benefits and side effects of this approach have been discussed and the patient gives informed consent to proceed.    STAGE: 0 (Tis N0 M0)    OPERATION PERFORMED:    1. Left partial mastectomy.  2. Ultrasound examination of the left breast following insertion of the Xoft balloon applicator into the lumpectomy cavity.  3. Treatment planning.  4. Intraoperative irradiation using the Xoft electronic brachytherapy system.    DESCRIPTION OF PROCEDURE:    Under general

## 2024-03-28 NOTE — CONSULTS
Radiation Oncology Consult Note    Requesting MD:  Madi Browning MD Admit Status:         History obtained from:   [x] Patient  [x] Other (specify): chart    CC: High-grade ductal carcinoma in situ of left breast    HPI: Ms. Michel is a 63 y.o. female with a history of abnormal screening mammogram at an outside facility showing increasing pleomorphic microcalcifications involving the left breast.    On 3/19/2024, stereotactic biopsy was performed showing high-grade ductal carcinoma in situ measuring 0.7 cm in greatest dimension and present in multiple cores.  Microcalcifications were identified.  ER was positive at 94%    MRI of the bilateral breasts performed 3/11/2024 showed a 5.8 x 0.9 x 2.3 cm clumped segmental non-mass enhancement surrounding a biopsy marker clip in the upper outer quadrant of the left breast.  No axillary or internal mammary lymphadenopathy was seen.  The right breast showed no enhancing masses or areas of non-mass enhancement.  No axillary or internal mammary lymphadenopathy was seen.    The range of treatment options was discussed with the patient by Dr. Madi Browning and she has elected to proceed with left breast lumpectomy with consideration of intraoperative radiation therapy utilizing the Xoft electronic brachytherapy system.    Benefits and side effects of this approach have been discussed and the patient gives informed consent to proceed.    Past Medical History:   Diagnosis Date    Allergic rhinitis     Cardiac arrhythmia     hx of; had ablation with resolution; per dr mariann hall    Ductal carcinoma (HCC)     Hypertension     Irritable bowel syndrome     Type II or unspecified type diabetes mellitus without mention of complication, not stated as uncontrolled      Menstrual history: The patient is G2, P2, Ab0. Menarche was at age 9.  The patient is unsure of her age at menopause.  She underwent partial hysterectomy at age 36. The patient did not experience hot flashes. The

## 2024-03-28 NOTE — H&P
HISTORY OF PRESENT ILLNESS:     Ms. Michel  is recently status post stereotactic breast biopsy  on the left which revealed a high grade ductal carcinoma in situ. ER positive at 94%. Prelude DX is elevated at 9.2     MRI-3/11/2024  Right breast:  There are no enhancing masses or areas of nonmass enhancement. There is no axillary or internal mammary lymphadenopathy.     Left breast:   There is 5.8 x 0.9 x 2.3 cm of clumped segmental non mass enhancement surrounding and encompassing a biopsy marker clip in the upper outer quadrant of the left breast at 2 o'clock middle depth, corresponding to the site of biopsy-proven   malignancy. The posterior extent of enhancement is located 1.6 cm anterior to the pectoralis muscle. There is no axillary or internal mammary lymphadenopathy.     Other:  Heterogeneous, nodular thyroid with asymmetrically enlarged right lobe.     IMPRESSION:  1.  Suspicious non mass enhancement at the site of biopsy-proven malignancy in the left breast extends up to 5.8 cm, corresponding to the calcifications on the mammogram, which could be targeted for bracket localization if breast conservation therapy is   pursued.  2.  No MRI evidence of malignancy in the right breast.     BI-RADS 6 - KNOWN MALIGNANCY     Recommendation:  Recommend continued surgical/oncologic management.  Electronically signed by: JULIAN MARSHALL M.D.    Mariela Michel is a 63 y.o. female with the [facility] [place]:  Patient Active Problem List    Diagnosis Date Noted    Ductal carcinoma in situ (DCIS) of left breast 03/21/2024    Irritable bowel syndrome 10/10/2014     Current Facility-Administered Medications   Medication Dose Route Frequency Provider Last Rate Last Admin    lactated ringers IV soln infusion   IntraVENous Continuous Hamilton Cordon  mL/hr at 03/28/24 0918 New Bag at 03/28/24 0918     Allergies: Other, Phenergan [promethazine], Tetanus toxoids, and Codeine  Past Medical History:

## 2024-03-29 NOTE — OP NOTE
[id number] [place] [address]                            OPERATIVE REPORT      PATIENT NAME: ERIN KEYS               : 1960  MED REC NO: [id number]                          ROOM: [facility]  ACCOUNT NO: [id number]                       ADMIT DATE: 2024  PROVIDER: Madi Browning MD      DATE OF PROCEDURE:  2024    SURGEON:  Madi Browning MD    PREOPERATIVE DIAGNOSIS:  Ductal carcinoma in situ, left breast.    POSTOPERATIVE DIAGNOSIS:  Ductal carcinoma in situ, left breast.    PROCEDURES:    1. Ultrasound-guided needle localization.  2. Placement of needle.  3. Left pectoral block - 6 levels.  4. Left partial mastectomy.  5. Utilization of MarginProbe intraoperative margin assessment device.  6. Preparation of tumor site cavity for intraoperative radiation therapy.  7. Placement of intraoperative radiation therapy catheter.  8. Placement of 3-dimensional implant for tumor site marking for additional radiation if required and mammographic followup.  9. Soft tissue transfer flaps, greater than 30 sq cm.  The primary defect measured 5 x 10 cm or 50 sq cm.  The secondary defect measured 11 x 12 cm for 132 sq cm for a total of 182 sq cm.    ANESTHESIA:  Pec block with MAC.    INDICATIONS:  The patient is a 63-year-old lady with newly-diagnosed DCIS.  MRI demonstrates what appears to be some additional DCIS trending toward the central portion of the breast.  We marked this to facilitate removal.  It was really not very visible with calcifications or density or anything on her mammogram.    DESCRIPTION OF PROCEDURE:  Today, she was brought to the operating room, adequately sedated, and then using ultrasound, we identified the previously placed clip and inserted a 5 cm Kopans wire directly into the clip.  We then prepped the skin with chlorhexidine and proceeded with our pectoral block.  We utilized a solution of 50 mL of 0.2%

## 2024-04-01 ENCOUNTER — TELEPHONE (OUTPATIENT)
Dept: SURGERY | Age: 64
End: 2024-04-01

## 2024-04-01 NOTE — TELEPHONE ENCOUNTER
Called patient. I spoke with Dr. Browning and he said that is normal. She states she has been wearing compression socks and swelling has went down

## 2024-04-01 NOTE — TELEPHONE ENCOUNTER
4/1/2024 Patient called and she had left lumpectomy on 3/28/2024.  Patient stated her legs are getting swollen and she wanted to know if this is normal.    Callback # [phone]  [email or web address]

## 2024-04-02 NOTE — PROGRESS NOTES
situ is seen at less than 1 mm from the closest   inked (anterior and caudal) surgical margins.   4.  Prior biopsy site changes including cicatrix formation.   5.  Overlying skin, negative for malignancy.     B.  Extended cranial margin:   1.  Fibroadenoma.   2.  No malignancy identified at inked surgical margins.     C.  Extended caudal margin:  Focal high-grade ductal carcinoma in situ,   solid pattern, identified in specimen, less than 1 mm from the inked   caudal margin.     D.  Extended lateral margin: No malignancy identified.     E.  Extended deep margin: No malignancy identified.     AJCC stage:pT1a pN not assigned (no lymph nodes submitted or found)     This was a little more extensive based on the MRI but we have clear margins and have done intraoperative radiation so I do not think she will benefit from additional radiation.  I do think hormonal therapy will be of some value.    PHYSICAL EXAM:  The  wounds look good with no evidence of infection, fluid accumulation, or skin necrosis.     STUART drain was removed without incident      IMPRESSION:    Doing well s/p left partial mastectomy with IORT     PLAN:She will need to see Omi in one week for fluid check.  She will need an appointment with Dr. Warren in 3 weeks.  I will want to see her back in one month.  She will call if anything changes.        I have seen, examined and reviewed this patient medication list, appropriate labs and imaging studies. I reviewed relevant medical records and others physician’s notes. I discussed the plans of care with the patient. I answered all the questions to the patient’s satisfaction.  I, Dr Madi Browning, personally performed the services described in this documentation as scribed by Donna Farah MA in my presence and is both accurate and complete.     (Please note that portions of this note were completed with a voice recognition program. Efforts were made to edit the dictations but occasionally words are

## 2024-04-04 ENCOUNTER — OFFICE VISIT (OUTPATIENT)
Dept: SURGERY | Age: 64
End: 2024-04-04

## 2024-04-04 VITALS — WEIGHT: 170 LBS | HEART RATE: 72 BPM | BODY MASS INDEX: 30.12 KG/M2 | HEIGHT: 63 IN

## 2024-04-04 DIAGNOSIS — Z98.890 STATUS POST LEFT BREAST LUMPECTOMY: ICD-10-CM

## 2024-04-04 DIAGNOSIS — D05.12 DUCTAL CARCINOMA IN SITU (DCIS) OF LEFT BREAST: Primary | ICD-10-CM

## 2024-04-04 PROCEDURE — 99024 POSTOP FOLLOW-UP VISIT: CPT | Performed by: SURGERY

## 2024-04-06 PROBLEM — Z98.890 STATUS POST LEFT BREAST LUMPECTOMY: Status: ACTIVE | Noted: 2024-04-06

## 2024-04-22 ENCOUNTER — TELEPHONE (OUTPATIENT)
Dept: HEMATOLOGY | Age: 64
End: 2024-04-22

## 2024-04-22 NOTE — TELEPHONE ENCOUNTER
Called patient and reminded patient of their appointment on 4/25/2024 and patient confirmed they would be here.

## 2024-04-23 NOTE — PROGRESS NOTES
information in the electronic medical record or other health records, independently interpreting results of tests, managing test results and communicating the results to the patient/family or caregiver.

## 2024-04-25 ENCOUNTER — OFFICE VISIT (OUTPATIENT)
Dept: HEMATOLOGY | Age: 64
End: 2024-04-25
Payer: MEDICARE

## 2024-04-25 ENCOUNTER — HOSPITAL ENCOUNTER (OUTPATIENT)
Dept: INFUSION THERAPY | Age: 64
Discharge: HOME | End: 2024-04-25
Payer: MEDICARE

## 2024-04-25 VITALS
BODY MASS INDEX: 30.95 KG/M2 | HEART RATE: 90 BPM | OXYGEN SATURATION: 97 % | TEMPERATURE: 98.3 F | DIASTOLIC BLOOD PRESSURE: 72 MMHG | WEIGHT: 174.7 LBS | HEIGHT: 63 IN | SYSTOLIC BLOOD PRESSURE: 142 MMHG

## 2024-04-25 DIAGNOSIS — Z71.89 CARE PLAN DISCUSSED WITH PATIENT: ICD-10-CM

## 2024-04-25 DIAGNOSIS — C50.912 INFILTRATING DUCTAL CARCINOMA OF LEFT FEMALE BREAST (HCC): Primary | ICD-10-CM

## 2024-04-25 PROCEDURE — G8417 CALC BMI ABV UP PARAM F/U: HCPCS | Performed by: INTERNAL MEDICINE

## 2024-04-25 PROCEDURE — 1036F TOBACCO NON-USER: CPT | Performed by: INTERNAL MEDICINE

## 2024-04-25 PROCEDURE — G8400 PT W/DXA NO RESULTS DOC: HCPCS | Performed by: INTERNAL MEDICINE

## 2024-04-25 PROCEDURE — 99204 OFFICE O/P NEW MOD 45 MIN: CPT | Performed by: INTERNAL MEDICINE

## 2024-04-25 PROCEDURE — 99212 OFFICE O/P EST SF 10 MIN: CPT

## 2024-04-25 PROCEDURE — G8427 DOCREV CUR MEDS BY ELIG CLIN: HCPCS | Performed by: INTERNAL MEDICINE

## 2024-04-25 PROCEDURE — 1090F PRES/ABSN URINE INCON ASSESS: CPT | Performed by: INTERNAL MEDICINE

## 2024-04-25 PROCEDURE — 3017F COLORECTAL CA SCREEN DOC REV: CPT | Performed by: INTERNAL MEDICINE

## 2024-04-25 PROCEDURE — 1123F ACP DISCUSS/DSCN MKR DOCD: CPT | Performed by: INTERNAL MEDICINE

## 2024-04-25 ASSESSMENT — PROMIS GLOBAL HEALTH SCALE
IN GENERAL, HOW WOULD YOU RATE YOUR SATISFACTION WITH YOUR SOCIAL ACTIVITIES AND RELATIONSHIPS [ON A SCALE OF 1 (POOR) TO 5 (EXCELLENT)]?: EXCELLENT
SUM OF RESPONSES TO QUESTIONS 3, 6, 7, & 8: 14
IN GENERAL, WOULD YOU SAY YOUR QUALITY OF LIFE IS...[ON A SCALE OF 1 (POOR) TO 5 (EXCELLENT)]: GOOD
IN THE PAST 7 DAYS, HOW WOULD YOU RATE YOUR PAIN ON AVERAGE [ON A SCALE FROM 0 (NO PAIN) TO 10 (WORST IMAGINABLE PAIN)]?: 0 NO PAIN
SUM OF RESPONSES TO QUESTIONS 2, 4, 5, & 10: 17
IN THE PAST 7 DAYS, HOW OFTEN HAVE YOU BEEN BOTHERED BY EMOTIONAL PROBLEMS, SUCH AS FEELING ANXIOUS, DEPRESSED, OR IRRITABLE [ON A SCALE FROM 1 (NEVER) TO 5 (ALWAYS)]?: RARELY
IN GENERAL, HOW WOULD YOU RATE YOUR MENTAL HEALTH, INCLUDING YOUR MOOD AND YOUR ABILITY TO THINK [ON A SCALE OF 1 (POOR) TO 5 (EXCELLENT)]?: EXCELLENT
IN GENERAL, HOW WOULD YOU RATE YOUR PHYSICAL HEALTH [ON A SCALE OF 1 (POOR) TO 5 (EXCELLENT)]?: VERY GOOD
TO WHAT EXTENT ARE YOU ABLE TO CARRY OUT YOUR EVERYDAY PHYSICAL ACTIVITIES SUCH AS WALKING, CLIMBING STAIRS, CARRYING GROCERIES, OR MOVING A CHAIR [ON A SCALE OF 1 (NOT AT ALL) TO 5 (COMPLETELY)]?: COMPLETELY
IN GENERAL, WOULD YOU SAY YOUR HEALTH IS...[ON A SCALE OF 1 (POOR) TO 5 (EXCELLENT)]: GOOD
IN GENERAL, PLEASE RATE HOW WELL YOU CARRY OUT YOUR USUAL SOCIAL ACTIVITIES (INCLUDES ACTIVITIES AT HOME, AT WORK, AND IN YOUR COMMUNITY, AND RESPONSIBILITIES AS A PARENT, CHILD, SPOUSE, EMPLOYEE, FRIEND, ETC) [ON A SCALE OF 1 (POOR) TO 5 (EXCELLENT)]?: EXCELLENT

## 2024-04-30 NOTE — PROGRESS NOTES
the time was spent in counseling patient or family and coordination of care. Counseling included but was not limited to time spent reviewing labs, imaging studies/ treatment plan and answering questions.

## 2024-05-06 ENCOUNTER — OFFICE VISIT (OUTPATIENT)
Dept: SURGERY | Age: 64
End: 2024-05-06

## 2024-05-06 VITALS — WEIGHT: 167 LBS | HEIGHT: 63 IN | BODY MASS INDEX: 29.59 KG/M2 | HEART RATE: 88 BPM | OXYGEN SATURATION: 98 %

## 2024-05-06 DIAGNOSIS — D05.12 DUCTAL CARCINOMA IN SITU (DCIS) OF LEFT BREAST: Primary | ICD-10-CM

## 2024-05-06 DIAGNOSIS — Z98.890 STATUS POST LEFT BREAST LUMPECTOMY: ICD-10-CM

## 2024-05-06 PROCEDURE — 99024 POSTOP FOLLOW-UP VISIT: CPT | Performed by: SURGERY

## 2024-05-08 DIAGNOSIS — D05.12 DUCTAL CARCINOMA IN SITU (DCIS) OF LEFT BREAST: Primary | ICD-10-CM

## 2024-05-08 DIAGNOSIS — Z98.890 STATUS POST LEFT BREAST LUMPECTOMY: ICD-10-CM

## 2024-05-16 ENCOUNTER — TELEPHONE (OUTPATIENT)
Dept: SURGERY | Age: 64
End: 2024-05-16

## 2024-05-16 NOTE — TELEPHONE ENCOUNTER
5/16/2024 Patient called and stated tape Dr Browning said to use on breast to smooth out scar is causing patient to have a reaction to it.  It is causing red bumps and blistering. Please call and advise.    Callback # [phone]

## 2024-05-16 NOTE — TELEPHONE ENCOUNTER
Questions answered. Ok to stop tape that Dr Browning recommended.   Apply topical steroid cream.    This has been electronically signed by Omi Stephens PA-C.

## 2024-05-20 LAB
Lab: NORMAL
Lab: NORMAL

## 2024-06-07 ENCOUNTER — TELEPHONE (OUTPATIENT)
Dept: SURGERY | Age: 64
End: 2024-06-07

## 2024-06-07 NOTE — TELEPHONE ENCOUNTER
6/7/2024 Patient called requesting 3 mo supply refill be called in to pharmacy for Montelukast and Tamoxifen     Callback # [phone]  [email or web address]

## 2024-06-14 DIAGNOSIS — D05.12 DUCTAL CARCINOMA IN SITU (DCIS) OF LEFT BREAST: Primary | ICD-10-CM

## 2024-06-14 RX ORDER — TAMOXIFEN CITRATE 20 MG/1
20 TABLET ORAL DAILY
Qty: 90 TABLET | Refills: 3 | Status: SHIPPED | OUTPATIENT
Start: 2024-06-14

## 2024-06-14 RX ORDER — MONTELUKAST SODIUM 10 MG/1
10 TABLET ORAL DAILY
Qty: 90 TABLET | Refills: 3 | Status: SHIPPED | OUTPATIENT
Start: 2024-06-14

## 2024-06-14 NOTE — TELEPHONE ENCOUNTER
Dosage verified with patient and rx sent.    This has been electronically signed by Omi Stephens PA-C.

## 2024-08-05 ENCOUNTER — OFFICE VISIT (OUTPATIENT)
Dept: SURGERY | Age: 64
End: 2024-08-05
Payer: MEDICARE

## 2024-08-05 VITALS — WEIGHT: 165 LBS | HEIGHT: 63 IN | BODY MASS INDEX: 29.23 KG/M2 | HEART RATE: 77 BPM | OXYGEN SATURATION: 98 %

## 2024-08-05 DIAGNOSIS — Z98.890 STATUS POST LEFT BREAST LUMPECTOMY: ICD-10-CM

## 2024-08-05 DIAGNOSIS — D05.12 DUCTAL CARCINOMA IN SITU (DCIS) OF LEFT BREAST: Primary | ICD-10-CM

## 2024-08-05 PROCEDURE — G8417 CALC BMI ABV UP PARAM F/U: HCPCS | Performed by: SURGERY

## 2024-08-05 PROCEDURE — 1090F PRES/ABSN URINE INCON ASSESS: CPT | Performed by: SURGERY

## 2024-08-05 PROCEDURE — 1123F ACP DISCUSS/DSCN MKR DOCD: CPT | Performed by: SURGERY

## 2024-08-05 PROCEDURE — 3017F COLORECTAL CA SCREEN DOC REV: CPT | Performed by: SURGERY

## 2024-08-05 PROCEDURE — 1036F TOBACCO NON-USER: CPT | Performed by: SURGERY

## 2024-08-05 PROCEDURE — 99213 OFFICE O/P EST LOW 20 MIN: CPT | Performed by: SURGERY

## 2024-08-05 PROCEDURE — G8400 PT W/DXA NO RESULTS DOC: HCPCS | Performed by: SURGERY

## 2024-08-05 PROCEDURE — G8427 DOCREV CUR MEDS BY ELIG CLIN: HCPCS | Performed by: SURGERY

## 2024-08-05 NOTE — PROGRESS NOTES
HISTORY OF PRESENT ILLNESS:    Ms. Mcihel presents today with left breast hardness with lump and warm to touch.    This is following left partial mastectomy with IORT on 3/28/2024    She is recently status post stereotactic breast biopsy  on the left which revealed a high grade ductal carcinoma in situ. ER positive at 94%. Prelude DX is elevated at 9.2    MRI-3/11/2024  Right breast:  There are no enhancing masses or areas of nonmass enhancement. There is no axillary or internal mammary lymphadenopathy.     Left breast:   There is 5.8 x 0.9 x 2.3 cm of clumped segmental non mass enhancement surrounding and encompassing a biopsy marker clip in the upper outer quadrant of the left breast at 2 o'clock middle depth, corresponding to the site of biopsy-proven   malignancy. The posterior extent of enhancement is located 1.6 cm anterior to the pectoralis muscle. There is no axillary or internal mammary lymphadenopathy.     Other:  Heterogeneous, nodular thyroid with asymmetrically enlarged right lobe.     IMPRESSION:  1.  Suspicious non mass enhancement at the site of biopsy-proven malignancy in the left breast extends up to 5.8 cm, corresponding to the calcifications on the mammogram, which could be targeted for bracket localization if breast conservation therapy is   pursued.  2.  No MRI evidence of malignancy in the right breast.     BI-RADS 6 - KNOWN MALIGNANCY     Recommendation:  Recommend continued surgical/oncologic management.  Electronically signed by: JULIAN MARSHALL M.D.    PATHOLOGY REVEALS:  FINAL DIAGNOSIS:     A.  Left breast, partial mastectomy with needle localization:   1.  Invasive ductal carcinoma  with lobular features, grade 1, (4 mm)   focally arising in a background of extensive high-grade ductal carcinoma   in situ, cribriform and solid architectures with comedonecrosis and   calcifications (at least 15 mm in greatest extent).   2.  Invasive carcinoma is seen 5 mm from the closest inked

## 2024-08-14 ENCOUNTER — TELEPHONE (OUTPATIENT)
Dept: SURGERY | Age: 64
End: 2024-08-14

## 2024-08-14 NOTE — TELEPHONE ENCOUNTER
8/14/2024 Patient called and stated Dr Browning had remove blood from breast and the knot has returned.  Please return call.    Callback # [phone]  [email or web address]

## 2024-08-15 ENCOUNTER — OFFICE VISIT (OUTPATIENT)
Dept: SURGERY | Age: 64
End: 2024-08-15

## 2024-08-15 VITALS
DIASTOLIC BLOOD PRESSURE: 70 MMHG | WEIGHT: 165 LBS | SYSTOLIC BLOOD PRESSURE: 132 MMHG | HEIGHT: 63 IN | BODY MASS INDEX: 29.23 KG/M2

## 2024-08-15 DIAGNOSIS — Z98.890 STATUS POST LEFT BREAST LUMPECTOMY: Primary | ICD-10-CM

## 2024-08-15 PROCEDURE — 99024 POSTOP FOLLOW-UP VISIT: CPT | Performed by: PHYSICIAN ASSISTANT

## 2024-08-15 NOTE — PROGRESS NOTES
HISTORY OF PRESENT ILLNESS:  Ms. Michel presents today with left breast hardness with lump and warm to touch.    This is following left partial mastectomy with IORT on 3/28/2024    She is recently status post stereotactic breast biopsy  on the left which revealed a high grade ductal carcinoma in situ. ER positive at 94%. Prelude DX is elevated at 9.2    MRI-3/11/2024  Right breast:  There are no enhancing masses or areas of nonmass enhancement. There is no axillary or internal mammary lymphadenopathy.     Left breast:   There is 5.8 x 0.9 x 2.3 cm of clumped segmental non mass enhancement surrounding and encompassing a biopsy marker clip in the upper outer quadrant of the left breast at 2 o'clock middle depth, corresponding to the site of biopsy-proven   malignancy. The posterior extent of enhancement is located 1.6 cm anterior to the pectoralis muscle. There is no axillary or internal mammary lymphadenopathy.     Other:  Heterogeneous, nodular thyroid with asymmetrically enlarged right lobe.     IMPRESSION:  1.  Suspicious non mass enhancement at the site of biopsy-proven malignancy in the left breast extends up to 5.8 cm, corresponding to the calcifications on the mammogram, which could be targeted for bracket localization if breast conservation therapy is   pursued.  2.  No MRI evidence of malignancy in the right breast.     BI-RADS 6 - KNOWN MALIGNANCY     Recommendation:  Recommend continued surgical/oncologic management.  Electronically signed by: JULIAN MARSHALL M.D.    PATHOLOGY REVEALS:  FINAL DIAGNOSIS:     A.  Left breast, partial mastectomy with needle localization:   1.  Invasive ductal carcinoma  with lobular features, grade 1, (4 mm)   focally arising in a background of extensive high-grade ductal carcinoma   in situ, cribriform and solid architectures with comedonecrosis and   calcifications (at least 15 mm in greatest extent).   2.  Invasive carcinoma is seen 5 mm from the closest inked

## 2024-09-05 NOTE — PROGRESS NOTES
HISTORY OF PRESENT ILLNESS:    Ms. Michel presents today for a one month exam for left breast     This is following left partial mastectomy with IORT on 3/28/2024    She is recently status post stereotactic breast biopsy  on the left which revealed a high grade ductal carcinoma in situ. ER positive at 94%. Prelude DX is elevated at 9.2    MRI-3/11/2024  Right breast:  There are no enhancing masses or areas of nonmass enhancement. There is no axillary or internal mammary lymphadenopathy.     Left breast:   There is 5.8 x 0.9 x 2.3 cm of clumped segmental non mass enhancement surrounding and encompassing a biopsy marker clip in the upper outer quadrant of the left breast at 2 o'clock middle depth, corresponding to the site of biopsy-proven   malignancy. The posterior extent of enhancement is located 1.6 cm anterior to the pectoralis muscle. There is no axillary or internal mammary lymphadenopathy.     Other:  Heterogeneous, nodular thyroid with asymmetrically enlarged right lobe.     IMPRESSION:  1.  Suspicious non mass enhancement at the site of biopsy-proven malignancy in the left breast extends up to 5.8 cm, corresponding to the calcifications on the mammogram, which could be targeted for bracket localization if breast conservation therapy is   pursued.  2.  No MRI evidence of malignancy in the right breast.     BI-RADS 6 - KNOWN MALIGNANCY     Recommendation:  Recommend continued surgical/oncologic management.  Electronically signed by: JULIAN MARSHALL M.D.    PATHOLOGY REVEALS:  FINAL DIAGNOSIS:     A.  Left breast, partial mastectomy with needle localization:   1.  Invasive ductal carcinoma  with lobular features, grade 1, (4 mm)   focally arising in a background of extensive high-grade ductal carcinoma   in situ, cribriform and solid architectures with comedonecrosis and   calcifications (at least 15 mm in greatest extent).   2.  Invasive carcinoma is seen 5 mm from the closest inked (anterior)   surgical

## 2024-09-06 ENCOUNTER — OFFICE VISIT (OUTPATIENT)
Dept: SURGERY | Age: 64
End: 2024-09-06
Payer: MEDICARE

## 2024-09-06 VITALS — OXYGEN SATURATION: 97 % | WEIGHT: 168 LBS | BODY MASS INDEX: 29.77 KG/M2 | HEIGHT: 63 IN | HEART RATE: 80 BPM

## 2024-09-06 DIAGNOSIS — D05.12 DUCTAL CARCINOMA IN SITU (DCIS) OF LEFT BREAST: Primary | ICD-10-CM

## 2024-09-06 DIAGNOSIS — Z98.890 STATUS POST LEFT BREAST LUMPECTOMY: ICD-10-CM

## 2024-09-06 PROCEDURE — 99213 OFFICE O/P EST LOW 20 MIN: CPT | Performed by: SURGERY

## 2024-09-06 PROCEDURE — 1090F PRES/ABSN URINE INCON ASSESS: CPT | Performed by: SURGERY

## 2024-09-06 PROCEDURE — 3017F COLORECTAL CA SCREEN DOC REV: CPT | Performed by: SURGERY

## 2024-09-06 PROCEDURE — G8417 CALC BMI ABV UP PARAM F/U: HCPCS | Performed by: SURGERY

## 2024-09-06 PROCEDURE — 1123F ACP DISCUSS/DSCN MKR DOCD: CPT | Performed by: SURGERY

## 2024-09-06 PROCEDURE — G8428 CUR MEDS NOT DOCUMENT: HCPCS | Performed by: SURGERY

## 2024-09-06 PROCEDURE — G8400 PT W/DXA NO RESULTS DOC: HCPCS | Performed by: SURGERY

## 2024-09-06 PROCEDURE — 1036F TOBACCO NON-USER: CPT | Performed by: SURGERY

## 2024-09-10 DIAGNOSIS — D05.12 DUCTAL CARCINOMA IN SITU (DCIS) OF LEFT BREAST: Primary | ICD-10-CM

## 2024-09-10 DIAGNOSIS — Z98.890 STATUS POST LEFT BREAST LUMPECTOMY: ICD-10-CM

## 2024-10-21 ENCOUNTER — TELEPHONE (OUTPATIENT)
Dept: HEMATOLOGY | Age: 64
End: 2024-10-21

## 2024-10-21 NOTE — TELEPHONE ENCOUNTER
Called pt. to remind them of appointment on 10/24/2024 and had to leave a detailed voicemail with appointment date and time. Reminded patient to just come at appointment time, and to not come at the lab appointment time. Reminded patient that we will not check them in any more than 30 minutes before appointment time. We have now moved to the [facility] [place] that is located between our old office and the ER at the [facility]. Letting the Pt know that our front entrance faces the  [facility], and also leaving our address.

## 2024-10-23 DIAGNOSIS — C50.912 INFILTRATING DUCTAL CARCINOMA OF LEFT FEMALE BREAST (HCC): Primary | ICD-10-CM

## 2024-10-23 NOTE — PROGRESS NOTES
Progress Note      Pt Name: Mariela Michel  YOB: 1960  MRN: [id number]    Date of evaluation: 10/24/2024  History Obtained From:  patient, electronic medical record    CHIEF COMPLAINT:    Chief Complaint   Patient presents with    Follow-up     Office Visit Infiltrating ductal carcinoma of left female breast (HCC)     HISTORY OF PRESENT ILLNESS:    Mariela Michel is a 63 y.o.  female who is currently being followed for     ONCOLOGIC HISTORY:     Diagnosis  Invasive ductal carcinoma, left upper outer breast, March 2024  Lobular features  Grade 1  ER 42%, TN 14%, HER-2 2+/equivocal, FISH positive, Ki67 0  pT1a   Stage IA  Associated DCIS  High grade  ER 94%     Treatment Summary  3/21/24 Initiated endocrine hormone therapy with Tamoxifen 20mg daily  x5 years, until March 2029  3/28/24 Left breast partial mastectomy by Dr Madi Browning/[facility]  3/28/24 IORT 2000cGy to left breast surgery site by Dr Rony Tomlinson     Cancer History  Mariela Michel was first seen by Dr Warren on 4/25/2024.  The patient was referred by Dr. Madi Browning for diagnosis of IDC of the left breast, stage I.  2/16/24 Bilateral screening mammogram ([facility]): There are scattered fibroglandular densities (approximately 25-50% glandular tissue) consistent with a type B pattern. Stable benign-appearing upper-outer LEFT breast equal density mass. There are bilateral scattered benign-appearing calcifications. Increasing upper-outer LEFT breast grouped microcalcifications. No new suspicious masses or areas of architectural distortion.   2/28/24 Left diagnostic mammogram ([facility]): There is a moderate size grouping of microcalcifictions in the upper outer quadrant of the left breast. These are pleomorphic appearing. They have increased in number compared to prior studies.  3/1/24 Breast, biopsy of left breast upper outer calcifications:   High-grade ductal carcinoma in situ.  In situ

## 2024-10-24 ENCOUNTER — OFFICE VISIT (OUTPATIENT)
Dept: HEMATOLOGY | Age: 64
End: 2024-10-24
Payer: MEDICARE

## 2024-10-24 ENCOUNTER — HOSPITAL ENCOUNTER (OUTPATIENT)
Dept: INFUSION THERAPY | Age: 64
Discharge: HOME | End: 2024-10-24
Payer: MEDICARE

## 2024-10-24 VITALS
WEIGHT: 162.6 LBS | SYSTOLIC BLOOD PRESSURE: 124 MMHG | HEART RATE: 79 BPM | OXYGEN SATURATION: 99 % | DIASTOLIC BLOOD PRESSURE: 66 MMHG | HEIGHT: 63 IN | BODY MASS INDEX: 28.81 KG/M2 | TEMPERATURE: 98 F

## 2024-10-24 DIAGNOSIS — Z71.89 CARE PLAN DISCUSSED WITH PATIENT: ICD-10-CM

## 2024-10-24 DIAGNOSIS — Z79.810 ENCOUNTER FOR MONITORING TAMOXIFEN THERAPY: ICD-10-CM

## 2024-10-24 DIAGNOSIS — Z51.81 ENCOUNTER FOR MONITORING TAMOXIFEN THERAPY: ICD-10-CM

## 2024-10-24 DIAGNOSIS — C50.912 INFILTRATING DUCTAL CARCINOMA OF LEFT FEMALE BREAST (HCC): Primary | ICD-10-CM

## 2024-10-24 DIAGNOSIS — C50.912 INFILTRATING DUCTAL CARCINOMA OF LEFT FEMALE BREAST (HCC): ICD-10-CM

## 2024-10-24 LAB
ALBUMIN SERPL-MCNC: 4.2 G/DL (ref 3.5–5.2)
ALP SERPL-CCNC: 50 U/L (ref 35–104)
ALT SERPL-CCNC: 13 U/L (ref 5–33)
ANION GAP SERPL CALCULATED.3IONS-SCNC: 10 MMOL/L (ref 7–19)
AST SERPL-CCNC: 18 U/L (ref 5–32)
BASOPHILS # BLD: 0.02 K/UL (ref 0.01–0.08)
BASOPHILS NFR BLD: 0.4 % (ref 0.1–1.2)
BILIRUB SERPL-MCNC: <0.2 MG/DL (ref 0–1.2)
BUN SERPL-MCNC: 13 MG/DL (ref 8–23)
CALCIUM SERPL-MCNC: 9.1 MG/DL (ref 8.8–10.2)
CHLORIDE SERPL-SCNC: 108 MMOL/L (ref 98–107)
CO2 SERPL-SCNC: 25 MMOL/L (ref 22–29)
CREAT SERPL-MCNC: 0.6 MG/DL (ref 0.5–0.9)
EOSINOPHIL # BLD: 0.08 K/UL (ref 0.04–0.54)
EOSINOPHIL NFR BLD: 1.4 % (ref 0.7–7)
ERYTHROCYTE [DISTWIDTH] IN BLOOD BY AUTOMATED COUNT: 14 % (ref 11.7–14.4)
GLUCOSE SERPL-MCNC: 99 MG/DL (ref 70–99)
HCT VFR BLD AUTO: 37.1 % (ref 34.1–44.9)
HGB BLD-MCNC: 12.1 G/DL (ref 11.2–15.7)
LYMPHOCYTES # BLD: 1.39 K/UL (ref 1.18–3.74)
LYMPHOCYTES NFR BLD: 24.8 % (ref 19.3–53.1)
MCH RBC QN AUTO: 29.3 PG (ref 25.6–32.2)
MCHC RBC AUTO-ENTMCNC: 32.6 G/DL (ref 32.3–35.5)
MCV RBC AUTO: 89.8 FL (ref 79.4–94.8)
MONOCYTES # BLD: 0.41 K/UL (ref 0.24–0.82)
MONOCYTES NFR BLD: 7.3 % (ref 4.7–12.5)
NEUTROPHILS # BLD: 3.69 K/UL (ref 1.56–6.13)
NEUTS SEG NFR BLD: 65.7 % (ref 34–71.1)
PLATELET # BLD AUTO: 236 K/UL (ref 182–369)
PMV BLD AUTO: 8.5 FL (ref 7.4–10.4)
POTASSIUM SERPL-SCNC: 3.9 MMOL/L (ref 3.5–5.1)
PROT SERPL-MCNC: 6.5 G/DL (ref 6.4–8.3)
RBC # BLD AUTO: 4.13 M/UL (ref 3.93–5.22)
SODIUM SERPL-SCNC: 143 MMOL/L (ref 136–145)
WBC # BLD AUTO: 5.61 K/UL (ref 3.98–10.04)

## 2024-10-24 PROCEDURE — 80053 COMPREHEN METABOLIC PANEL: CPT

## 2024-10-24 PROCEDURE — G8484 FLU IMMUNIZE NO ADMIN: HCPCS | Performed by: NURSE PRACTITIONER

## 2024-10-24 PROCEDURE — 99204 OFFICE O/P NEW MOD 45 MIN: CPT | Performed by: NURSE PRACTITIONER

## 2024-10-24 PROCEDURE — 36415 COLL VENOUS BLD VENIPUNCTURE: CPT

## 2024-10-24 PROCEDURE — 85025 COMPLETE CBC W/AUTO DIFF WBC: CPT

## 2024-10-24 PROCEDURE — G8427 DOCREV CUR MEDS BY ELIG CLIN: HCPCS | Performed by: NURSE PRACTITIONER

## 2024-10-24 PROCEDURE — 99212 OFFICE O/P EST SF 10 MIN: CPT

## 2024-10-24 PROCEDURE — G8417 CALC BMI ABV UP PARAM F/U: HCPCS | Performed by: NURSE PRACTITIONER

## 2024-10-24 PROCEDURE — 1090F PRES/ABSN URINE INCON ASSESS: CPT | Performed by: NURSE PRACTITIONER

## 2024-10-24 PROCEDURE — 1123F ACP DISCUSS/DSCN MKR DOCD: CPT | Performed by: NURSE PRACTITIONER

## 2024-10-24 PROCEDURE — 1036F TOBACCO NON-USER: CPT | Performed by: NURSE PRACTITIONER

## 2024-10-24 PROCEDURE — 3017F COLORECTAL CA SCREEN DOC REV: CPT | Performed by: NURSE PRACTITIONER

## 2024-10-24 PROCEDURE — G8400 PT W/DXA NO RESULTS DOC: HCPCS | Performed by: NURSE PRACTITIONER

## 2024-10-29 ASSESSMENT — ENCOUNTER SYMPTOMS
DIARRHEA: 0
VOMITING: 0
EYES NEGATIVE: 1
EYE DISCHARGE: 0
ABDOMINAL PAIN: 0
WHEEZING: 0
GASTROINTESTINAL NEGATIVE: 1
RESPIRATORY NEGATIVE: 1
EYE REDNESS: 0
EYE PAIN: 0
COUGH: 0
SORE THROAT: 0
BACK PAIN: 0
BLOOD IN STOOL: 0
NAUSEA: 0
CONSTIPATION: 0
SHORTNESS OF BREATH: 0

## 2024-11-05 ENCOUNTER — HOSPITAL ENCOUNTER (OUTPATIENT)
Dept: WOMENS IMAGING | Age: 64
Discharge: HOME | End: 2024-11-05
Payer: MEDICARE

## 2024-11-05 VITALS — WEIGHT: 155 LBS | BODY MASS INDEX: 27.46 KG/M2

## 2024-11-05 DIAGNOSIS — Z98.890 STATUS POST LEFT BREAST LUMPECTOMY: ICD-10-CM

## 2024-11-05 DIAGNOSIS — D05.12 DUCTAL CARCINOMA IN SITU (DCIS) OF LEFT BREAST: ICD-10-CM

## 2024-11-05 PROCEDURE — G0279 TOMOSYNTHESIS, MAMMO: HCPCS

## 2024-11-12 NOTE — PROGRESS NOTES
HISTORY OF PRESENT ILLNESS:    Ms. 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 Prelude DX is elevated at 9.2    Unilateral Left Mammogram-11/5/2024    FINDINGS: The breast tissue is heterogeneously dense which can obscure small masses.  BIOZORB biopsy clips are present in the left breast.  Postsurgical changes noted.  In the left breast.  Pathologic calcifications.     IMPRESSION:  BI-RADS 2:  Benign.     RECOMMENDATION: Recommend patient to return for bilateral digital mammography February 2025           ______________________________________   Electronically signed by: ELOISE ANDERSEN M.D.    MRI-3/11/2024  Right breast:  There are no enhancing masses or areas of nonmass enhancement. There is no axillary or internal mammary lymphadenopathy.     Left breast:   There is 5.8 x 0.9 x 2.3 cm of clumped segmental non mass enhancement surrounding and encompassing a biopsy marker clip in the upper outer quadrant of the left breast at 2 o'clock middle depth, corresponding to the site of biopsy-proven   malignancy. The posterior extent of enhancement is located 1.6 cm anterior to the pectoralis muscle. There is no axillary or internal mammary lymphadenopathy.     Other:  Heterogeneous, nodular thyroid with asymmetrically enlarged right lobe.     IMPRESSION:  1.  Suspicious non mass enhancement at the site of biopsy-proven malignancy in the left breast extends up to 5.8 cm, corresponding to the calcifications on the mammogram, which could be targeted for bracket localization if breast conservation therapy is   pursued.  2.  No MRI evidence of malignancy in the right breast.     BI-RADS 6 - KNOWN MALIGNANCY     Recommendation:  Recommend continued surgical/oncologic management.  Electronically signed by: JULIAN MARSHALL M.D.    PATHOLOGY

## 2024-11-13 ENCOUNTER — OFFICE VISIT (OUTPATIENT)
Dept: SURGERY | Age: 64
End: 2024-11-13
Payer: MEDICARE

## 2024-11-13 VITALS — HEART RATE: 86 BPM | HEIGHT: 63 IN | WEIGHT: 163 LBS | BODY MASS INDEX: 28.88 KG/M2 | OXYGEN SATURATION: 97 %

## 2024-11-13 DIAGNOSIS — Z98.890 STATUS POST LEFT BREAST LUMPECTOMY: Primary | ICD-10-CM

## 2024-11-13 DIAGNOSIS — D05.12 DUCTAL CARCINOMA IN SITU (DCIS) OF LEFT BREAST: ICD-10-CM

## 2024-11-13 PROCEDURE — 3017F COLORECTAL CA SCREEN DOC REV: CPT | Performed by: SURGERY

## 2024-11-13 PROCEDURE — 99213 OFFICE O/P EST LOW 20 MIN: CPT | Performed by: SURGERY

## 2024-11-13 PROCEDURE — G8484 FLU IMMUNIZE NO ADMIN: HCPCS | Performed by: SURGERY

## 2024-11-13 PROCEDURE — 1036F TOBACCO NON-USER: CPT | Performed by: SURGERY

## 2024-11-13 PROCEDURE — G8400 PT W/DXA NO RESULTS DOC: HCPCS | Performed by: SURGERY

## 2024-11-13 PROCEDURE — 1159F MED LIST DOCD IN RCRD: CPT | Performed by: SURGERY

## 2024-11-13 PROCEDURE — 1090F PRES/ABSN URINE INCON ASSESS: CPT | Performed by: SURGERY

## 2024-11-13 PROCEDURE — G8427 DOCREV CUR MEDS BY ELIG CLIN: HCPCS | Performed by: SURGERY

## 2024-11-13 PROCEDURE — G8417 CALC BMI ABV UP PARAM F/U: HCPCS | Performed by: SURGERY

## 2024-11-13 PROCEDURE — 1123F ACP DISCUSS/DSCN MKR DOCD: CPT | Performed by: SURGERY

## 2024-11-19 DIAGNOSIS — Z85.3 PERSONAL HISTORY OF BREAST CANCER: Primary | ICD-10-CM

## 2025-02-18 ENCOUNTER — TELEPHONE (OUTPATIENT)
Dept: HEMATOLOGY | Age: 65
End: 2025-02-18

## 2025-02-18 NOTE — TELEPHONE ENCOUNTER
Called Patient and reminded patient of their appointment on 02/24/2025 and patient confirmed they would be here. Reminded patient to just come at appointment time, and to not come at the lab appointment time. Reminded patient that we will not check them in any more than 30 minutes before appointment time.  We have now moved to the [facility] that is located between our old office and the ER [facility] the [facility] [place]. Letting the Pt know that our front entrance faces the  [employer] [place]. Reminded pt to eat well and be well hydrated for their labs. Let patient know that if we are closed due to inclement weather we will call them the night before or the day of. We will be calling from a blocked number so if they do not answer we will leave a voicemail if that is an option.

## 2025-02-21 DIAGNOSIS — C50.912 INFILTRATING DUCTAL CARCINOMA OF LEFT FEMALE BREAST (HCC): Primary | ICD-10-CM

## 2025-02-21 NOTE — PROGRESS NOTES
therapy.  -Keep follow up with Dr Browning on 05/01/2025 with bilateral mammogram     2. Encounter for monitoring tamoxifen therapy  Hot flashes-denied  Vaginal dryness-denied    3. Care plan discussed with patient  Unable to recall when last bone density study was completed: An order has been placed for A bone density study to be conducted on the same day.      I discussed all of the above findings included in the assessment and plan with the patient and the patient is in agreement to move forward with current recommendations/treatment.  I have addressed all of their questions and concerns that were verbalized.       FOLLOW UP:  Follow-up appointment made for 4 months, or sooner, if needed breast cancer  Continue to follow with other medical providers as recommended  Labs at next visit: CBC and consider [facility]    EMR Dragon/Transcription disclaimer:   Much of this encounter note is an electronic transcription/translation of spoken language to printed text. The electronic translation of spoken language may permit erroneous, or at times, nonsensical words or phrases to be inadvertently transcribed; although attempts have made to review the note for such errors, some may still exist.  Please excuse any unrecognized transcription errors and contact us if the error is unintelligible or needs documented correction.  Also, portions of this note have been copied forward, however, changed to reflect the most current clinical status of this patient.    The patient (or guardian, if applicable) and other individuals in attendance with the patient were advised that Artificial Intelligence will be utilized during this visit to record, process the conversation to generate a clinical note, and support improvement of the AI technology. The patient (or guardian, if applicable) and other individuals in attendance at the appointment consented to the use of AI, including the recording.         Electronically signed by LM Chapa on

## 2025-02-24 ENCOUNTER — HOSPITAL ENCOUNTER (OUTPATIENT)
Dept: INFUSION THERAPY | Age: 65
Discharge: HOME | End: 2025-02-24
Payer: MEDICARE

## 2025-02-24 ENCOUNTER — OFFICE VISIT (OUTPATIENT)
Dept: HEMATOLOGY | Age: 65
End: 2025-02-24
Payer: MEDICARE

## 2025-02-24 VITALS
HEIGHT: 63 IN | SYSTOLIC BLOOD PRESSURE: 134 MMHG | OXYGEN SATURATION: 96 % | TEMPERATURE: 97.7 F | HEART RATE: 75 BPM | DIASTOLIC BLOOD PRESSURE: 74 MMHG | WEIGHT: 164.8 LBS | BODY MASS INDEX: 29.2 KG/M2

## 2025-02-24 DIAGNOSIS — C50.912 INFILTRATING DUCTAL CARCINOMA OF LEFT FEMALE BREAST (HCC): ICD-10-CM

## 2025-02-24 DIAGNOSIS — Z78.0 POSTMENOPAUSAL STATUS (AGE-RELATED) (NATURAL): Primary | ICD-10-CM

## 2025-02-24 DIAGNOSIS — Z51.81 ENCOUNTER FOR MONITORING TAMOXIFEN THERAPY: ICD-10-CM

## 2025-02-24 DIAGNOSIS — D05.12 DUCTAL CARCINOMA IN SITU (DCIS) OF LEFT BREAST: ICD-10-CM

## 2025-02-24 DIAGNOSIS — Z79.810 ENCOUNTER FOR MONITORING TAMOXIFEN THERAPY: ICD-10-CM

## 2025-02-24 DIAGNOSIS — Z71.89 CARE PLAN DISCUSSED WITH PATIENT: ICD-10-CM

## 2025-02-24 LAB
ALBUMIN SERPL-MCNC: 4 G/DL (ref 3.5–5.2)
ALP SERPL-CCNC: 47 U/L (ref 35–104)
ALT SERPL-CCNC: 12 U/L (ref 5–33)
ANION GAP SERPL CALCULATED.3IONS-SCNC: 9 MMOL/L (ref 7–19)
AST SERPL-CCNC: 17 U/L (ref 5–32)
BASOPHILS # BLD: 0.01 K/UL (ref 0–0.2)
BASOPHILS NFR BLD: 0.2 % (ref 0–1)
BILIRUB SERPL-MCNC: <0.2 MG/DL (ref 0–1.2)
BUN SERPL-MCNC: 12 MG/DL (ref 8–23)
CALCIUM SERPL-MCNC: 8.9 MG/DL (ref 8.8–10.2)
CHLORIDE SERPL-SCNC: 106 MMOL/L (ref 98–107)
CO2 SERPL-SCNC: 26 MMOL/L (ref 22–29)
CREAT SERPL-MCNC: 0.6 MG/DL (ref 0.5–0.9)
EOSINOPHIL # BLD: 0.06 K/UL (ref 0–0.6)
EOSINOPHIL NFR BLD: 1.3 % (ref 0–5)
ERYTHROCYTE [DISTWIDTH] IN BLOOD BY AUTOMATED COUNT: 14.3 % (ref 11.5–14.5)
GLUCOSE SERPL-MCNC: 93 MG/DL (ref 70–99)
HCT VFR BLD AUTO: 36.2 % (ref 37–47)
HGB BLD-MCNC: 12 G/DL (ref 12–16)
LYMPHOCYTES # BLD: 1.19 K/UL (ref 1.1–4.5)
LYMPHOCYTES NFR BLD: 25.3 % (ref 20–40)
MCH RBC QN AUTO: 30.3 PG (ref 27–31)
MCHC RBC AUTO-ENTMCNC: 33.1 G/DL (ref 33–37)
MCV RBC AUTO: 91.4 FL (ref 81–99)
MONOCYTES # BLD: 0.32 K/UL (ref 0–0.9)
MONOCYTES NFR BLD: 6.8 % (ref 1–10)
NEUTROPHILS # BLD: 3.11 K/UL (ref 1.5–7.5)
NEUTS SEG NFR BLD: 66 % (ref 50–65)
PLATELET # BLD AUTO: 211 K/UL (ref 130–400)
PMV BLD AUTO: 8.4 FL (ref 9.4–12.3)
POTASSIUM SERPL-SCNC: 3.9 MMOL/L (ref 3.5–5.1)
PROT SERPL-MCNC: 6.4 G/DL (ref 6.4–8.3)
RBC # BLD AUTO: 3.96 M/UL (ref 4.2–5.4)
SODIUM SERPL-SCNC: 141 MMOL/L (ref 136–145)
WBC # BLD AUTO: 4.71 K/UL (ref 4.8–10.8)

## 2025-02-24 PROCEDURE — G8427 DOCREV CUR MEDS BY ELIG CLIN: HCPCS | Performed by: NURSE PRACTITIONER

## 2025-02-24 PROCEDURE — G8400 PT W/DXA NO RESULTS DOC: HCPCS | Performed by: NURSE PRACTITIONER

## 2025-02-24 PROCEDURE — 99214 OFFICE O/P EST MOD 30 MIN: CPT | Performed by: NURSE PRACTITIONER

## 2025-02-24 PROCEDURE — 1036F TOBACCO NON-USER: CPT | Performed by: NURSE PRACTITIONER

## 2025-02-24 PROCEDURE — G8417 CALC BMI ABV UP PARAM F/U: HCPCS | Performed by: NURSE PRACTITIONER

## 2025-02-24 PROCEDURE — 80053 COMPREHEN METABOLIC PANEL: CPT

## 2025-02-24 PROCEDURE — 1123F ACP DISCUSS/DSCN MKR DOCD: CPT | Performed by: NURSE PRACTITIONER

## 2025-02-24 PROCEDURE — 1159F MED LIST DOCD IN RCRD: CPT | Performed by: NURSE PRACTITIONER

## 2025-02-24 PROCEDURE — 85025 COMPLETE CBC W/AUTO DIFF WBC: CPT

## 2025-02-24 PROCEDURE — 3017F COLORECTAL CA SCREEN DOC REV: CPT | Performed by: NURSE PRACTITIONER

## 2025-02-24 PROCEDURE — 1126F AMNT PAIN NOTED NONE PRSNT: CPT | Performed by: NURSE PRACTITIONER

## 2025-02-24 PROCEDURE — 1090F PRES/ABSN URINE INCON ASSESS: CPT | Performed by: NURSE PRACTITIONER

## 2025-02-24 PROCEDURE — 36415 COLL VENOUS BLD VENIPUNCTURE: CPT

## 2025-02-24 PROCEDURE — 99213 OFFICE O/P EST LOW 20 MIN: CPT

## 2025-02-24 RX ORDER — TAMOXIFEN CITRATE 20 MG/1
20 TABLET ORAL DAILY
Qty: 90 TABLET | Refills: 3 | Status: SHIPPED | OUTPATIENT
Start: 2025-02-24

## 2025-02-25 ASSESSMENT — ENCOUNTER SYMPTOMS
BACK PAIN: 0
CONSTIPATION: 0
EYE REDNESS: 0
DIARRHEA: 0
EYES NEGATIVE: 1
SORE THROAT: 0
SHORTNESS OF BREATH: 0
NAUSEA: 0
VOMITING: 0
BLOOD IN STOOL: 0
RESPIRATORY NEGATIVE: 1
WHEEZING: 0
COUGH: 0
GASTROINTESTINAL NEGATIVE: 1
ABDOMINAL PAIN: 0
EYE DISCHARGE: 0
EYE PAIN: 0

## 2025-05-01 ENCOUNTER — HOSPITAL ENCOUNTER (OUTPATIENT)
Dept: WOMENS IMAGING | Age: 65
Discharge: HOME | End: 2025-05-01
Payer: MEDICARE

## 2025-05-01 ENCOUNTER — OFFICE VISIT (OUTPATIENT)
Dept: SURGERY | Age: 65
End: 2025-05-01
Payer: MEDICARE

## 2025-05-01 VITALS — HEART RATE: 81 BPM | OXYGEN SATURATION: 98 % | WEIGHT: 155 LBS | BODY MASS INDEX: 27.46 KG/M2 | HEIGHT: 63 IN

## 2025-05-01 VITALS — BODY MASS INDEX: 26.57 KG/M2 | WEIGHT: 150 LBS

## 2025-05-01 DIAGNOSIS — Z85.3 PERSONAL HISTORY OF BREAST CANCER: ICD-10-CM

## 2025-05-01 DIAGNOSIS — Z78.0 POSTMENOPAUSAL STATUS (AGE-RELATED) (NATURAL): ICD-10-CM

## 2025-05-01 DIAGNOSIS — Z85.3 HISTORY OF BREAST CANCER: Primary | ICD-10-CM

## 2025-05-01 PROCEDURE — 3017F COLORECTAL CA SCREEN DOC REV: CPT | Performed by: PHYSICIAN ASSISTANT

## 2025-05-01 PROCEDURE — 1123F ACP DISCUSS/DSCN MKR DOCD: CPT | Performed by: PHYSICIAN ASSISTANT

## 2025-05-01 PROCEDURE — 99213 OFFICE O/P EST LOW 20 MIN: CPT | Performed by: PHYSICIAN ASSISTANT

## 2025-05-01 PROCEDURE — 1159F MED LIST DOCD IN RCRD: CPT | Performed by: PHYSICIAN ASSISTANT

## 2025-05-01 PROCEDURE — 1090F PRES/ABSN URINE INCON ASSESS: CPT | Performed by: PHYSICIAN ASSISTANT

## 2025-05-01 PROCEDURE — 77080 DXA BONE DENSITY AXIAL: CPT

## 2025-05-01 PROCEDURE — G8427 DOCREV CUR MEDS BY ELIG CLIN: HCPCS | Performed by: PHYSICIAN ASSISTANT

## 2025-05-01 PROCEDURE — G0279 TOMOSYNTHESIS, MAMMO: HCPCS

## 2025-05-01 PROCEDURE — G8417 CALC BMI ABV UP PARAM F/U: HCPCS | Performed by: PHYSICIAN ASSISTANT

## 2025-05-01 PROCEDURE — G8400 PT W/DXA NO RESULTS DOC: HCPCS | Performed by: PHYSICIAN ASSISTANT

## 2025-05-01 PROCEDURE — 1036F TOBACCO NON-USER: CPT | Performed by: PHYSICIAN ASSISTANT

## 2025-05-01 RX ORDER — ROSUVASTATIN CALCIUM 5 MG/1
TABLET, COATED ORAL
COMMUNITY
Start: 2025-04-23

## 2025-05-01 NOTE — PROGRESS NOTES
HISTORY OF PRESENT ILLNESS:  Ms. Michel presents today for a breast exam. She is s/p left partial mastectomy with IORT on 3/28/2024. She has no new complaints.    She is recently status post stereotactic breast biopsy  on the left which revealed a high grade ductal carcinoma in situ. ER positive at 94%. Prelude DX is elevated at 9.2    MRI-3/11/2024  Right breast:  There are no enhancing masses or areas of nonmass enhancement. There is no axillary or internal mammary lymphadenopathy.     Left breast:   There is 5.8 x 0.9 x 2.3 cm of clumped segmental non mass enhancement surrounding and encompassing a biopsy marker clip in the upper outer quadrant of the left breast at 2 o'clock middle depth, corresponding to the site of biopsy-proven   malignancy. The posterior extent of enhancement is located 1.6 cm anterior to the pectoralis muscle. There is no axillary or internal mammary lymphadenopathy.     Other:  Heterogeneous, nodular thyroid with asymmetrically enlarged right lobe.     IMPRESSION:  1.  Suspicious non mass enhancement at the site of biopsy-proven malignancy in the left breast extends up to 5.8 cm, corresponding to the calcifications on the mammogram, which could be targeted for bracket localization if breast conservation therapy is   pursued.  2.  No MRI evidence of malignancy in the right breast.     BI-RADS 6 - KNOWN MALIGNANCY     Recommendation:  Recommend continued surgical/oncologic management.  Electronically signed by: JULIAN MARSHALL M.D.    PATHOLOGY REVEALS:  FINAL DIAGNOSIS:     A.  Left breast, partial mastectomy with needle localization:   1.  Invasive ductal carcinoma  with lobular features, grade 1, (4 mm)   focally arising in a background of extensive high-grade ductal carcinoma   in situ, cribriform and solid architectures with comedonecrosis and   calcifications (at least 15 mm in greatest extent).   2.  Invasive carcinoma is seen 5 mm from the closest inked (anterior)   surgical

## 2025-05-08 ENCOUNTER — RESULTS FOLLOW-UP (OUTPATIENT)
Dept: HEMATOLOGY | Age: 65
End: 2025-05-08

## 2025-06-09 DIAGNOSIS — D05.12 DUCTAL CARCINOMA IN SITU (DCIS) OF LEFT BREAST: ICD-10-CM

## 2025-06-10 RX ORDER — MONTELUKAST SODIUM 10 MG/1
10 TABLET ORAL DAILY
Qty: 90 TABLET | Refills: 3 | Status: SHIPPED | OUTPATIENT
Start: 2025-06-10

## 2025-08-26 DIAGNOSIS — C50.912 INFILTRATING DUCTAL CARCINOMA OF LEFT FEMALE BREAST (HCC): Primary | ICD-10-CM

## 2025-08-28 ENCOUNTER — TELEPHONE (OUTPATIENT)
Dept: HEMATOLOGY | Age: 65
End: 2025-08-28

## 2025-09-02 ENCOUNTER — HOSPITAL ENCOUNTER (OUTPATIENT)
Dept: INFUSION THERAPY | Age: 65
Discharge: HOME | End: 2025-09-02

## 2025-09-02 ENCOUNTER — OFFICE VISIT (OUTPATIENT)
Dept: HEMATOLOGY | Age: 65
End: 2025-09-02
Payer: MEDICARE

## 2025-09-02 VITALS
HEART RATE: 80 BPM | TEMPERATURE: 97.5 F | BODY MASS INDEX: 29.48 KG/M2 | OXYGEN SATURATION: 98 % | SYSTOLIC BLOOD PRESSURE: 130 MMHG | DIASTOLIC BLOOD PRESSURE: 68 MMHG | WEIGHT: 166.4 LBS | HEIGHT: 63 IN

## 2025-09-02 DIAGNOSIS — C50.912 INFILTRATING DUCTAL CARCINOMA OF LEFT FEMALE BREAST (HCC): ICD-10-CM

## 2025-09-02 DIAGNOSIS — M85.852 OSTEOPENIA OF NECK OF LEFT FEMUR: ICD-10-CM

## 2025-09-02 DIAGNOSIS — Z79.810 ENCOUNTER FOR MONITORING TAMOXIFEN THERAPY: ICD-10-CM

## 2025-09-02 DIAGNOSIS — Z71.89 CARE PLAN DISCUSSED WITH PATIENT: ICD-10-CM

## 2025-09-02 DIAGNOSIS — Z51.81 ENCOUNTER FOR MONITORING TAMOXIFEN THERAPY: ICD-10-CM

## 2025-09-02 DIAGNOSIS — D05.12 DUCTAL CARCINOMA IN SITU (DCIS) OF LEFT BREAST: ICD-10-CM

## 2025-09-02 DIAGNOSIS — Z78.0 POSTMENOPAUSAL STATUS (AGE-RELATED) (NATURAL): Primary | ICD-10-CM

## 2025-09-02 PROCEDURE — 99214 OFFICE O/P EST MOD 30 MIN: CPT | Performed by: NURSE PRACTITIONER

## 2025-09-02 PROCEDURE — G8427 DOCREV CUR MEDS BY ELIG CLIN: HCPCS | Performed by: NURSE PRACTITIONER

## 2025-09-02 PROCEDURE — 1123F ACP DISCUSS/DSCN MKR DOCD: CPT | Performed by: NURSE PRACTITIONER

## 2025-09-02 PROCEDURE — 1090F PRES/ABSN URINE INCON ASSESS: CPT | Performed by: NURSE PRACTITIONER

## 2025-09-02 PROCEDURE — 1036F TOBACCO NON-USER: CPT | Performed by: NURSE PRACTITIONER

## 2025-09-02 PROCEDURE — 1159F MED LIST DOCD IN RCRD: CPT | Performed by: NURSE PRACTITIONER

## 2025-09-02 PROCEDURE — G2211 COMPLEX E/M VISIT ADD ON: HCPCS | Performed by: NURSE PRACTITIONER

## 2025-09-02 PROCEDURE — 1126F AMNT PAIN NOTED NONE PRSNT: CPT | Performed by: NURSE PRACTITIONER

## 2025-09-02 PROCEDURE — 3017F COLORECTAL CA SCREEN DOC REV: CPT | Performed by: NURSE PRACTITIONER

## 2025-09-02 PROCEDURE — G8417 CALC BMI ABV UP PARAM F/U: HCPCS | Performed by: NURSE PRACTITIONER

## 2025-09-02 PROCEDURE — G8399 PT W/DXA RESULTS DOCUMENT: HCPCS | Performed by: NURSE PRACTITIONER

## 2025-09-02 RX ORDER — BLOOD SUGAR DIAGNOSTIC
1 STRIP MISCELLANEOUS DAILY
COMMUNITY
Start: 2025-08-25

## 2025-09-02 RX ORDER — PREDNISOLONE ACETATE 10 MG/ML
1 SUSPENSION/ DROPS OPHTHALMIC DAILY
COMMUNITY
Start: 2025-08-28

## 2025-09-02 RX ORDER — TAMOXIFEN CITRATE 20 MG/1
20 TABLET ORAL DAILY
Qty: 90 TABLET | Refills: 3 | Status: SHIPPED | OUTPATIENT
Start: 2025-09-02

## 2025-09-02 RX ORDER — GLIPIZIDE 10 MG/1
10 TABLET, FILM COATED, EXTENDED RELEASE ORAL DAILY
COMMUNITY
Start: 2025-07-21

## 2025-09-02 ASSESSMENT — ENCOUNTER SYMPTOMS
GASTROINTESTINAL NEGATIVE: 1
SHORTNESS OF BREATH: 0
EYES NEGATIVE: 1
RESPIRATORY NEGATIVE: 1
BLOOD IN STOOL: 0
EYE PAIN: 0
BACK PAIN: 0
CONSTIPATION: 0
VOMITING: 0
EYE DISCHARGE: 0
DIARRHEA: 0
EYE REDNESS: 0
WHEEZING: 0
ABDOMINAL PAIN: 0
COUGH: 0
NAUSEA: 0
SORE THROAT: 0

## (undated) DEVICE — DRAIN JACKSON PRATT ROUND 15FR: Brand: CARDINAL HEALTH

## (undated) DEVICE — SPECIMEN ORIENTATION CHARMS, SIX DISTINCTLY SHAPED STERILE 10MM CHARMS: Brand: MARGINMAP

## (undated) DEVICE — ADHESIVE SKIN CLOSURE WND 8.661X1.5 IN 22 CM LIQUIBAND SECUR

## (undated) DEVICE — ST PRIM PUMP 20DRP 3VLV 127IN

## (undated) DEVICE — SUTURE MNCRYL STRATAFIX PS 4-0 30CM

## (undated) DEVICE — HAWKINS™ III FLEXSTRAND™ BLN 20GA X 5CM: Brand: BREAST LOCALIZATION NEEDLES

## (undated) DEVICE — KT MINI ACC MAK401

## (undated) DEVICE — PINNACLE INTRODUCER SHEATH: Brand: PINNACLE

## (undated) DEVICE — JACKSON-PRATT 100CC BULB RESERVOIR: Brand: CARDINAL HEALTH

## (undated) DEVICE — KT ELECTRD ENSITE PRECISION SURF

## (undated) DEVICE — CATH ABL THERP THERMOCPL 7F 4X2MM 110CM MD

## (undated) DEVICE — SYRINGE IRRIG 60ML SFT PLIABLE BLB EZ TO GRP 1 HND USE W/

## (undated) DEVICE — SOLIDIFIER LIQUI LOC PLUS 2000CC

## (undated) DEVICE — SUTURE PROL SZ 2-0 L30IN NONABSORBABLE BLU L26MM CT-2 1/2 8411H

## (undated) DEVICE — SUTURE VCRL SZ 2-0 L36IN ABSRB UD L36MM CT-1 1/2 CIR J945H

## (undated) DEVICE — 3M™ IOBAN™ 2 ANTIMICROBIAL INCISE DRAPE 6650EZ: Brand: IOBAN™ 2

## (undated) DEVICE — Device

## (undated) DEVICE — MAJOR CDS

## (undated) DEVICE — CATH QUAD JSN 5F 5MM 120CM

## (undated) DEVICE — SUTURE STRATAFIX SPRL MCRYL + SZ 4-0 L12IN ABSRB UD PS-2 SXMP1B117

## (undated) DEVICE — CATH QUAD CRD 5F 5MM 120CM

## (undated) DEVICE — PK CATH CARD 30

## (undated) DEVICE — 3 ML SYRINGE WITH HYPODERMIC SAFETY NEEDLE: Brand: MAGELLAN

## (undated) DEVICE — 60 ML SYRINGE,ECCENTRIC TIP: Brand: MONOJECT

## (undated) DEVICE — CATHETER URETH 24FR 30CC BLLN SILAS F 2 W SPEC M RND TIP

## (undated) DEVICE — SUTURE PERMAHAND SZ 2-0 L18IN NONABSORBABLE BLK L26MM SH C012D

## (undated) DEVICE — PLASMABLADE X PS210-030S-LIGHT 3.0SL: Brand: PLASMABLADE™ X

## (undated) DEVICE — PROBE DTECT MARGIN F/LUMPECTOMY

## (undated) DEVICE — SUTURE ETHLN SZ 2-0 L30IN NONABSORBABLE BLK L36MM FSLX 3/8 1674H

## (undated) DEVICE — PACKAGE ASSY BALLOON POUCH ST 5-6CM

## (undated) DEVICE — Z DISC USE 2220190 SUTURE VCRL SZ 3-0 L27IN ABSRB UD L26MM SH 1/2 CIR J416H

## (undated) DEVICE — PACK,UNIVERSAL,NO GOWNS: Brand: MEDLINE

## (undated) DEVICE — PAD E/S GRND SGL/FOIL 9FT/CORD DISP

## (undated) DEVICE — ELECTRD PAD DEFIB A/

## (undated) DEVICE — SOL IRR NACL 0.9PCT BT 1000ML

## (undated) DEVICE — SHEET,DRAPE,53X77,STERILE: Brand: MEDLINE

## (undated) DEVICE — GLOVE SURG SZ 75 CRM LTX FREE POLYISOPRENE POLYMER BEAD ANTI

## (undated) DEVICE — GAUZE,SPONGE,FLUFF,6"X6.75",STRL,10/TRAY: Brand: MEDLINE

## (undated) DEVICE — TOWEL,OR,DSP,ST,BLUE,DLX,4/PK,20PK/CS: Brand: MEDLINE

## (undated) DEVICE — PEN: MARKING STD 100/CS: Brand: MEDICAL ACTION INDUSTRIES

## (undated) DEVICE — CANN CO2/O2 NASL A/

## (undated) DEVICE — DRESSING TRNSPAR W5XL4.5IN FLM SHT SEMIPERMEABLE WIND

## (undated) DEVICE — DRESSING GERM 6-12FR DIA1IN CNTR H 4MM ANTIMIC PROTCT DISK

## (undated) DEVICE — GOWN, ORBIS, XXLARGE, STERILE: Brand: MEDLINE